# Patient Record
Sex: FEMALE | Race: WHITE | NOT HISPANIC OR LATINO | URBAN - METROPOLITAN AREA
[De-identification: names, ages, dates, MRNs, and addresses within clinical notes are randomized per-mention and may not be internally consistent; named-entity substitution may affect disease eponyms.]

---

## 2019-09-12 ENCOUNTER — APPOINTMENT (RX ONLY)
Dept: URBAN - METROPOLITAN AREA CLINIC 170 | Facility: CLINIC | Age: 44
Setting detail: DERMATOLOGY
End: 2019-09-12

## 2019-09-12 DIAGNOSIS — T78.40 ALLERGY, UNSPECIFIED: ICD-10-CM

## 2019-09-12 DIAGNOSIS — Z85.828 PERSONAL HISTORY OF OTHER MALIGNANT NEOPLASM OF SKIN: ICD-10-CM

## 2019-09-12 DIAGNOSIS — L82.1 OTHER SEBORRHEIC KERATOSIS: ICD-10-CM

## 2019-09-12 DIAGNOSIS — L70.0 ACNE VULGARIS: ICD-10-CM

## 2019-09-12 DIAGNOSIS — D18.0 HEMANGIOMA: ICD-10-CM

## 2019-09-12 DIAGNOSIS — D22 MELANOCYTIC NEVI: ICD-10-CM

## 2019-09-12 DIAGNOSIS — L57.0 ACTINIC KERATOSIS: ICD-10-CM

## 2019-09-12 DIAGNOSIS — L81.4 OTHER MELANIN HYPERPIGMENTATION: ICD-10-CM

## 2019-09-12 PROBLEM — T78.40XA ALLERGY, UNSPECIFIED, INITIAL ENCOUNTER: Status: ACTIVE | Noted: 2019-09-12

## 2019-09-12 PROBLEM — D18.01 HEMANGIOMA OF SKIN AND SUBCUTANEOUS TISSUE: Status: ACTIVE | Noted: 2019-09-12

## 2019-09-12 PROBLEM — D22.62 MELANOCYTIC NEVI OF LEFT UPPER LIMB, INCLUDING SHOULDER: Status: ACTIVE | Noted: 2019-09-12

## 2019-09-12 PROCEDURE — ? PHOTO-DOCUMENTATION

## 2019-09-12 PROCEDURE — ? COUNSELING

## 2019-09-12 PROCEDURE — ? PRESCRIPTION

## 2019-09-12 PROCEDURE — ? SUNSCREEN RECOMMENDATIONS

## 2019-09-12 PROCEDURE — 17000 DESTRUCT PREMALG LESION: CPT

## 2019-09-12 PROCEDURE — ? INVENTORY

## 2019-09-12 PROCEDURE — ? LIQUID NITROGEN

## 2019-09-12 PROCEDURE — 99213 OFFICE O/P EST LOW 20 MIN: CPT | Mod: 25

## 2019-09-12 RX ORDER — MUPIROCIN 20 MG/G
OINTMENT TOPICAL
Qty: 1 | Refills: 5 | Status: ERX | COMMUNITY
Start: 2019-09-12

## 2019-09-12 RX ORDER — FLUCONAZOLE 150 MG/1
TABLET ORAL
Qty: 1 | Refills: 5 | Status: ERX | COMMUNITY
Start: 2019-09-12

## 2019-09-12 RX ORDER — METRONIDAZOLE 7.5 MG/G
CREAM TOPICAL
Qty: 1 | Refills: 11 | Status: ERX | COMMUNITY
Start: 2019-09-12

## 2019-09-12 RX ORDER — DOXYCYCLINE HYCLATE 100 MG/1
CAPSULE, GELATIN COATED ORAL
Qty: 60 | Refills: 2 | Status: ERX | COMMUNITY
Start: 2019-09-12

## 2019-09-12 RX ADMIN — MUPIROCIN: 20 OINTMENT TOPICAL at 12:47

## 2019-09-12 RX ADMIN — DOXYCYCLINE HYCLATE: 100 CAPSULE, GELATIN COATED ORAL at 12:45

## 2019-09-12 RX ADMIN — FLUCONAZOLE: 150 TABLET ORAL at 12:50

## 2019-09-12 RX ADMIN — METRONIDAZOLE: 7.5 CREAM TOPICAL at 12:46

## 2019-09-12 ASSESSMENT — LOCATION DETAILED DESCRIPTION DERM
LOCATION DETAILED: RIGHT OCCIPITAL SCALP
LOCATION DETAILED: RIGHT LATERAL ABDOMEN
LOCATION DETAILED: LEFT DISTAL DORSAL FOREARM
LOCATION DETAILED: INFERIOR THORACIC SPINE
LOCATION DETAILED: RIGHT MID-UPPER BACK

## 2019-09-12 ASSESSMENT — LOCATION ZONE DERM
LOCATION ZONE: TRUNK
LOCATION ZONE: ARM
LOCATION ZONE: SCALP

## 2019-09-12 ASSESSMENT — LOCATION SIMPLE DESCRIPTION DERM
LOCATION SIMPLE: POSTERIOR SCALP
LOCATION SIMPLE: LEFT FOREARM
LOCATION SIMPLE: UPPER BACK
LOCATION SIMPLE: ABDOMEN
LOCATION SIMPLE: RIGHT UPPER BACK

## 2019-09-12 NOTE — PROCEDURE: LIQUID NITROGEN
Consent: The patient's consent was obtained including but not limited to risks of crusting, scabbing, blistering, scarring, darker or lighter pigmentary change, recurrence, incomplete removal and infection.
Detail Level: Detailed
Render Post-Care Instructions In Note?: yes
Number Of Freeze-Thaw Cycles: 1 freeze-thaw cycle
Post-Care Instructions: I reviewed with the patient in detail post-care instructions. Patient is to wear sunprotection, and avoid picking at any of the treated lesions. Pt may apply Vaseline to crusted or scabbing areas.
Duration Of Freeze Thaw-Cycle (Seconds): 5
Render Note In Bullet Format When Appropriate: No

## 2019-09-12 NOTE — HPI: EVALUATION OF SKIN LESION(S)
What Type Of Note Output Would You Prefer (Optional)?: Bullet Format
Hpi Title: Evaluation of Skin Lesions
Family Member: Dad
Additional History: \\nCheck right scalp, new growth, 1 month, no problem.\\nLeft lateral forearm, black growth, x years.

## 2019-09-12 NOTE — PROCEDURE: COUNSELING
Detail Level: Detailed
Detail Level: Generalized
Tazorac Pregnancy And Lactation Text: This medication is not safe during pregnancy. It is unknown if this medication is excreted in breast milk.
Benzoyl Peroxide Counseling: Patient counseled that medicine may cause skin irritation and bleach clothing.  In the event of skin irritation, the patient was advised to reduce the amount of the drug applied or use it less frequently.   The patient verbalized understanding of the proper use and possible adverse effects of benzoyl peroxide.  All of the patient's questions and concerns were addressed.
Topical Sulfur Applications Pregnancy And Lactation Text: This medication is Pregnancy Category C and has an unknown safety profile during pregnancy. It is unknown if this topical medication is excreted in breast milk.
Minocycline Pregnancy And Lactation Text: This medication is Pregnancy Category D and not consider safe during pregnancy. It is also excreted in breast milk.
Isotretinoin Counseling: Patient should get monthly blood tests, not donate blood, not drive at night if vision affected, not share medication, and not undergo elective surgery for 6 months after tx completed. Side effects reviewed, pt to contact office should one occur.
Dapsone Pregnancy And Lactation Text: This medication is Pregnancy Category C and is not considered safe during pregnancy or breast feeding.
Bactrim Counseling:  I discussed with the patient the risks of sulfa antibiotics including but not limited to GI upset, allergic reaction, drug rash, diarrhea, dizziness, photosensitivity, and yeast infections.  Rarely, more serious reactions can occur including but not limited to aplastic anemia, agranulocytosis, methemoglobinemia, blood dyscrasias, liver or kidney failure, lung infiltrates or desquamative/blistering drug rashes.
Tazorac Counseling:  Patient advised that medication is irritating and drying.  Patient may need to apply sparingly and wash off after an hour before eventually leaving it on overnight.  The patient verbalized understanding of the proper use and possible adverse effects of tazorac.  All of the patient's questions and concerns were addressed.
Minocycline Counseling: Patient advised regarding possible photosensitivity and discoloration of the teeth, skin, lips, tongue and gums.  Patient instructed to avoid sunlight, if possible.  When exposed to sunlight, patients should wear protective clothing, sunglasses, and sunscreen.  The patient was instructed to call the office immediately if the following severe adverse effects occur:  hearing changes, easy bruising/bleeding, severe headache, or vision changes.  The patient verbalized understanding of the proper use and possible adverse effects of minocycline.  All of the patient's questions and concerns were addressed.
Topical Sulfur Applications Counseling: Topical Sulfur Counseling: Patient counseled that this medication may cause skin irritation or allergic reactions.  In the event of skin irritation, the patient was advised to reduce the amount of the drug applied or use it less frequently.   The patient verbalized understanding of the proper use and possible adverse effects of topical sulfur application.  All of the patient's questions and concerns were addressed.
Erythromycin Pregnancy And Lactation Text: This medication is Pregnancy Category B and is considered safe during pregnancy. It is also excreted in breast milk.
Dapsone Counseling: I discussed with the patient the risks of dapsone including but not limited to hemolytic anemia, agranulocytosis, rashes, methemoglobinemia, kidney failure, peripheral neuropathy, headaches, GI upset, and liver toxicity.  Patients who start dapsone require monitoring including baseline LFTs and weekly CBCs for the first month, then every month thereafter.  The patient verbalized understanding of the proper use and possible adverse effects of dapsone.  All of the patient's questions and concerns were addressed.
Topical Retinoid Pregnancy And Lactation Text: This medication is Pregnancy Category C. It is unknown if this medication is excreted in breast milk.
Azithromycin Pregnancy And Lactation Text: This medication is considered safe during pregnancy and is also secreted in breast milk.
Tetracycline Counseling: Patient counseled regarding possible photosensitivity and increased risk for sunburn.  Patient instructed to avoid sunlight, if possible.  When exposed to sunlight, patients should wear protective clothing, sunglasses, and sunscreen.  The patient was instructed to call the office immediately if the following severe adverse effects occur:  hearing changes, easy bruising/bleeding, severe headache, or vision changes.  The patient verbalized understanding of the proper use and possible adverse effects of tetracycline.  All of the patient's questions and concerns were addressed. Patient understands to avoid pregnancy while on therapy due to potential birth defects.
High Dose Vitamin A Pregnancy And Lactation Text: High dose vitamin A therapy is contraindicated during pregnancy and breast feeding.
Topical Clindamycin Pregnancy And Lactation Text: This medication is Pregnancy Category B and is considered safe during pregnancy. It is unknown if it is excreted in breast milk.
Erythromycin Counseling:  I discussed with the patient the risks of erythromycin including but not limited to GI upset, allergic reaction, drug rash, diarrhea, increase in liver enzymes, and yeast infections.
Detail Level: Zone
Azithromycin Counseling:  I discussed with the patient the risks of azithromycin including but not limited to GI upset, allergic reaction, drug rash, diarrhea, and yeast infections.
Birth Control Pills Pregnancy And Lactation Text: This medication should be avoided if pregnant and for the first 30 days post-partum.
Topical Retinoid counseling:  Patient advised to apply a pea-sized amount only at bedtime and wait 30 minutes after washing their face before applying.  If too drying, patient may add a non-comedogenic moisturizer. The patient verbalized understanding of the proper use and possible adverse effects of retinoids.  All of the patient's questions and concerns were addressed.
Use Enhanced Medication Counseling?: No
Spironolactone Pregnancy And Lactation Text: This medication can cause feminization of the male fetus and should be avoided during pregnancy. The active metabolite is also found in breast milk.
High Dose Vitamin A Counseling: Side effects reviewed, pt to contact office should one occur.
Topical Clindamycin Counseling: Patient counseled that this medication may cause skin irritation or allergic reactions.  In the event of skin irritation, the patient was advised to reduce the amount of the drug applied or use it less frequently.   The patient verbalized understanding of the proper use and possible adverse effects of clindamycin.  All of the patient's questions and concerns were addressed.
Doxycycline Pregnancy And Lactation Text: This medication is Pregnancy Category D and not consider safe during pregnancy. It is also excreted in breast milk but is considered safe for shorter treatment courses.
Birth Control Pills Counseling: Birth Control Pill Counseling: I discussed with the patient the potential side effects of OCPs including but not limited to increased risk of stroke, heart attack, thrombophlebitis, deep venous thrombosis, hepatic adenomas, breast changes, GI upset, headaches, and depression.  The patient verbalized understanding of the proper use and possible adverse effects of OCPs. All of the patient's questions and concerns were addressed.
Benzoyl Peroxide Pregnancy And Lactation Text: This medication is Pregnancy Category C. It is unknown if benzoyl peroxide is excreted in breast milk.
Spironolactone Counseling: Patient advised regarding risks of diarrhea, abdominal pain, hyperkalemia, birth defects (for female patients), liver toxicity and renal toxicity. The patient may need blood work to monitor liver and kidney function and potassium levels while on therapy. The patient verbalized understanding of the proper use and possible adverse effects of spironolactone.  All of the patient's questions and concerns were addressed.
Isotretinoin Pregnancy And Lactation Text: This medication is Pregnancy Category X and is considered extremely dangerous during pregnancy. It is unknown if it is excreted in breast milk.
Bactrim Pregnancy And Lactation Text: This medication is Pregnancy Category D and is known to cause fetal risk.  It is also excreted in breast milk.
Doxycycline Counseling:  Patient counseled regarding possible photosensitivity and increased risk for sunburn.  Patient instructed to avoid sunlight, if possible.  When exposed to sunlight, patients should wear protective clothing, sunglasses, and sunscreen.  The patient was instructed to call the office immediately if the following severe adverse effects occur:  hearing changes, easy bruising/bleeding, severe headache, or vision changes.  The patient verbalized understanding of the proper use and possible adverse effects of doxycycline.  All of the patient's questions and concerns were addressed.

## 2019-09-12 NOTE — HPI: PIMPLES (ACNE)
What Type Of Note Output Would You Prefer (Optional)?: Bullet Format
How Severe Is Your Acne?: mild
Is This A New Presentation, Or A Follow-Up?: Follow Up Acne
Additional Comments (Use Complete Sentences): Need refills for metronidazole and doxycycline.

## 2019-09-12 NOTE — HPI: OTHER
Condition:: Allergic to neosporyn, need refills for muporcin.
Please Describe Your Condition:: Open sores

## 2019-09-12 NOTE — PROCEDURE: PHOTO-DOCUMENTATION
Photo Preface (Leave Blank If You Do Not Want): Photographs were obtained today
Detail Level: Detailed
Details (Free Text): Left lateral forearm

## 2021-05-06 ENCOUNTER — APPOINTMENT (RX ONLY)
Dept: URBAN - METROPOLITAN AREA CLINIC 170 | Facility: CLINIC | Age: 46
Setting detail: DERMATOLOGY
End: 2021-05-06

## 2021-05-06 DIAGNOSIS — L81.4 OTHER MELANIN HYPERPIGMENTATION: ICD-10-CM | Status: STABLE

## 2021-05-06 DIAGNOSIS — Z85.828 PERSONAL HISTORY OF OTHER MALIGNANT NEOPLASM OF SKIN: ICD-10-CM

## 2021-05-06 DIAGNOSIS — D485 NEOPLASM OF UNCERTAIN BEHAVIOR OF SKIN: ICD-10-CM

## 2021-05-06 DIAGNOSIS — D22 MELANOCYTIC NEVI: ICD-10-CM | Status: STABLE

## 2021-05-06 DIAGNOSIS — L82.1 OTHER SEBORRHEIC KERATOSIS: ICD-10-CM | Status: STABLE

## 2021-05-06 DIAGNOSIS — T78.40 ALLERGY, UNSPECIFIED: ICD-10-CM | Status: STABLE

## 2021-05-06 DIAGNOSIS — L70.0 ACNE VULGARIS: ICD-10-CM | Status: STABLE

## 2021-05-06 DIAGNOSIS — D18.0 HEMANGIOMA: ICD-10-CM | Status: STABLE

## 2021-05-06 PROBLEM — D48.5 NEOPLASM OF UNCERTAIN BEHAVIOR OF SKIN: Status: ACTIVE | Noted: 2021-05-06

## 2021-05-06 PROBLEM — D18.01 HEMANGIOMA OF SKIN AND SUBCUTANEOUS TISSUE: Status: ACTIVE | Noted: 2021-05-06

## 2021-05-06 PROBLEM — D22.5 MELANOCYTIC NEVI OF TRUNK: Status: ACTIVE | Noted: 2021-05-06

## 2021-05-06 PROBLEM — T78.40XA ALLERGY, UNSPECIFIED, INITIAL ENCOUNTER: Status: ACTIVE | Noted: 2021-05-06

## 2021-05-06 PROCEDURE — ? COUNSELING

## 2021-05-06 PROCEDURE — ? ADDITIONAL NOTES

## 2021-05-06 PROCEDURE — 11102 TANGNTL BX SKIN SINGLE LES: CPT

## 2021-05-06 PROCEDURE — ? FULL BODY SKIN EXAM

## 2021-05-06 PROCEDURE — ? PRESCRIPTION

## 2021-05-06 PROCEDURE — ? BIOPSY BY SHAVE METHOD

## 2021-05-06 PROCEDURE — 99214 OFFICE O/P EST MOD 30 MIN: CPT | Mod: 25

## 2021-05-06 PROCEDURE — ? TREATMENT REGIMEN

## 2021-05-06 RX ORDER — DOXYCYCLINE HYCLATE 100 MG/1
CAPSULE, GELATIN COATED ORAL
Qty: 60 | Refills: 2 | Status: ERX

## 2021-05-06 RX ORDER — MUPIROCIN 20 MG/G
OINTMENT TOPICAL
Qty: 1 | Refills: 2 | Status: ERX

## 2021-05-06 RX ORDER — FLUCONAZOLE 150 MG/1
TABLET ORAL
Qty: 1 | Refills: 5 | Status: ERX

## 2021-05-06 RX ORDER — METRONIDAZOLE 7.5 MG/G
CREAM TOPICAL
Qty: 1 | Refills: 11 | Status: ERX

## 2021-05-06 ASSESSMENT — LOCATION DETAILED DESCRIPTION DERM
LOCATION DETAILED: EPIGASTRIC SKIN
LOCATION DETAILED: INFERIOR THORACIC SPINE
LOCATION DETAILED: RIGHT MID-UPPER BACK
LOCATION DETAILED: RIGHT LATERAL ABDOMEN
LOCATION DETAILED: SUPERIOR THORACIC SPINE

## 2021-05-06 ASSESSMENT — LOCATION SIMPLE DESCRIPTION DERM
LOCATION SIMPLE: RIGHT UPPER BACK
LOCATION SIMPLE: UPPER BACK
LOCATION SIMPLE: ABDOMEN

## 2021-05-06 ASSESSMENT — LOCATION ZONE DERM: LOCATION ZONE: TRUNK

## 2021-05-06 NOTE — HPI: EVALUATION OF SKIN LESION(S)
What Type Of Note Output Would You Prefer (Optional)?: Bullet Format
Hpi Title: Evaluation of Skin Lesions
How Severe Are Your Spot(S)?: mild
Have Your Spot(S) Been Treated In The Past?: has not been treated
Additional History: Bio dad might have had skin cancer but not sure. Full body two spots on neck / upper back wants to be looked at .

## 2021-05-06 NOTE — PROCEDURE: COUNSELING
Tazorac Pregnancy And Lactation Text: This medication is not safe during pregnancy. It is unknown if this medication is excreted in breast milk.
Benzoyl Peroxide Counseling: Patient counseled that medicine may cause skin irritation and bleach clothing.  In the event of skin irritation, the patient was advised to reduce the amount of the drug applied or use it less frequently.   The patient verbalized understanding of the proper use and possible adverse effects of benzoyl peroxide.  All of the patient's questions and concerns were addressed.
Topical Sulfur Applications Pregnancy And Lactation Text: This medication is Pregnancy Category C and has an unknown safety profile during pregnancy. It is unknown if this topical medication is excreted in breast milk.
Minocycline Pregnancy And Lactation Text: This medication is Pregnancy Category D and not consider safe during pregnancy. It is also excreted in breast milk.
Isotretinoin Counseling: Patient should get monthly blood tests, not donate blood, not drive at night if vision affected, not share medication, and not undergo elective surgery for 6 months after tx completed. Side effects reviewed, pt to contact office should one occur.
Dapsone Pregnancy And Lactation Text: This medication is Pregnancy Category C and is not considered safe during pregnancy or breast feeding.
Bactrim Counseling:  I discussed with the patient the risks of sulfa antibiotics including but not limited to GI upset, allergic reaction, drug rash, diarrhea, dizziness, photosensitivity, and yeast infections.  Rarely, more serious reactions can occur including but not limited to aplastic anemia, agranulocytosis, methemoglobinemia, blood dyscrasias, liver or kidney failure, lung infiltrates or desquamative/blistering drug rashes.
Tazorac Counseling:  Patient advised that medication is irritating and drying.  Patient may need to apply sparingly and wash off after an hour before eventually leaving it on overnight.  The patient verbalized understanding of the proper use and possible adverse effects of tazorac.  All of the patient's questions and concerns were addressed.
Minocycline Counseling: Patient advised regarding possible photosensitivity and discoloration of the teeth, skin, lips, tongue and gums.  Patient instructed to avoid sunlight, if possible.  When exposed to sunlight, patients should wear protective clothing, sunglasses, and sunscreen.  The patient was instructed to call the office immediately if the following severe adverse effects occur:  hearing changes, easy bruising/bleeding, severe headache, or vision changes.  The patient verbalized understanding of the proper use and possible adverse effects of minocycline.  All of the patient's questions and concerns were addressed.
Topical Sulfur Applications Counseling: Topical Sulfur Counseling: Patient counseled that this medication may cause skin irritation or allergic reactions.  In the event of skin irritation, the patient was advised to reduce the amount of the drug applied or use it less frequently.   The patient verbalized understanding of the proper use and possible adverse effects of topical sulfur application.  All of the patient's questions and concerns were addressed.
Erythromycin Pregnancy And Lactation Text: This medication is Pregnancy Category B and is considered safe during pregnancy. It is also excreted in breast milk.
Dapsone Counseling: I discussed with the patient the risks of dapsone including but not limited to hemolytic anemia, agranulocytosis, rashes, methemoglobinemia, kidney failure, peripheral neuropathy, headaches, GI upset, and liver toxicity.  Patients who start dapsone require monitoring including baseline LFTs and weekly CBCs for the first month, then every month thereafter.  The patient verbalized understanding of the proper use and possible adverse effects of dapsone.  All of the patient's questions and concerns were addressed.
Topical Retinoid Pregnancy And Lactation Text: This medication is Pregnancy Category C. It is unknown if this medication is excreted in breast milk.
Azithromycin Pregnancy And Lactation Text: This medication is considered safe during pregnancy and is also secreted in breast milk.
Tetracycline Counseling: Patient counseled regarding possible photosensitivity and increased risk for sunburn.  Patient instructed to avoid sunlight, if possible.  When exposed to sunlight, patients should wear protective clothing, sunglasses, and sunscreen.  The patient was instructed to call the office immediately if the following severe adverse effects occur:  hearing changes, easy bruising/bleeding, severe headache, or vision changes.  The patient verbalized understanding of the proper use and possible adverse effects of tetracycline.  All of the patient's questions and concerns were addressed. Patient understands to avoid pregnancy while on therapy due to potential birth defects.
High Dose Vitamin A Pregnancy And Lactation Text: High dose vitamin A therapy is contraindicated during pregnancy and breast feeding.
Topical Clindamycin Pregnancy And Lactation Text: This medication is Pregnancy Category B and is considered safe during pregnancy. It is unknown if it is excreted in breast milk.
Erythromycin Counseling:  I discussed with the patient the risks of erythromycin including but not limited to GI upset, allergic reaction, drug rash, diarrhea, increase in liver enzymes, and yeast infections.
Detail Level: Zone
Azithromycin Counseling:  I discussed with the patient the risks of azithromycin including but not limited to GI upset, allergic reaction, drug rash, diarrhea, and yeast infections.
Birth Control Pills Pregnancy And Lactation Text: This medication should be avoided if pregnant and for the first 30 days post-partum.
Topical Retinoid counseling:  Patient advised to apply a pea-sized amount only at bedtime and wait 30 minutes after washing their face before applying.  If too drying, patient may add a non-comedogenic moisturizer. The patient verbalized understanding of the proper use and possible adverse effects of retinoids.  All of the patient's questions and concerns were addressed.
Use Enhanced Medication Counseling?: No
Spironolactone Pregnancy And Lactation Text: This medication can cause feminization of the male fetus and should be avoided during pregnancy. The active metabolite is also found in breast milk.
High Dose Vitamin A Counseling: Side effects reviewed, pt to contact office should one occur.
Topical Clindamycin Counseling: Patient counseled that this medication may cause skin irritation or allergic reactions.  In the event of skin irritation, the patient was advised to reduce the amount of the drug applied or use it less frequently.   The patient verbalized understanding of the proper use and possible adverse effects of clindamycin.  All of the patient's questions and concerns were addressed.
Doxycycline Pregnancy And Lactation Text: This medication is Pregnancy Category D and not consider safe during pregnancy. It is also excreted in breast milk but is considered safe for shorter treatment courses.
Birth Control Pills Counseling: Birth Control Pill Counseling: I discussed with the patient the potential side effects of OCPs including but not limited to increased risk of stroke, heart attack, thrombophlebitis, deep venous thrombosis, hepatic adenomas, breast changes, GI upset, headaches, and depression.  The patient verbalized understanding of the proper use and possible adverse effects of OCPs. All of the patient's questions and concerns were addressed.
Benzoyl Peroxide Pregnancy And Lactation Text: This medication is Pregnancy Category C. It is unknown if benzoyl peroxide is excreted in breast milk.
Spironolactone Counseling: Patient advised regarding risks of diarrhea, abdominal pain, hyperkalemia, birth defects (for female patients), liver toxicity and renal toxicity. The patient may need blood work to monitor liver and kidney function and potassium levels while on therapy. The patient verbalized understanding of the proper use and possible adverse effects of spironolactone.  All of the patient's questions and concerns were addressed.
Isotretinoin Pregnancy And Lactation Text: This medication is Pregnancy Category X and is considered extremely dangerous during pregnancy. It is unknown if it is excreted in breast milk.
Bactrim Pregnancy And Lactation Text: This medication is Pregnancy Category D and is known to cause fetal risk.  It is also excreted in breast milk.
Doxycycline Counseling:  Patient counseled regarding possible photosensitivity and increased risk for sunburn.  Patient instructed to avoid sunlight, if possible.  When exposed to sunlight, patients should wear protective clothing, sunglasses, and sunscreen.  The patient was instructed to call the office immediately if the following severe adverse effects occur:  hearing changes, easy bruising/bleeding, severe headache, or vision changes.  The patient verbalized understanding of the proper use and possible adverse effects of doxycycline.  All of the patient's questions and concerns were addressed.
Detail Level: Detailed
Detail Level: Generalized

## 2021-05-06 NOTE — PROCEDURE: BIOPSY BY SHAVE METHOD

## 2021-05-23 PROCEDURE — 87086 URINE CULTURE/COLONY COUNT: CPT

## 2021-05-23 PROCEDURE — 81001 URINALYSIS AUTO W/SCOPE: CPT

## 2021-05-23 PROCEDURE — 96372 THER/PROPH/DIAG INJ SC/IM: CPT

## 2021-05-23 PROCEDURE — 96361 HYDRATE IV INFUSION ADD-ON: CPT

## 2021-05-23 PROCEDURE — 96374 THER/PROPH/DIAG INJ IV PUSH: CPT

## 2021-05-23 PROCEDURE — 96375 TX/PRO/DX INJ NEW DRUG ADDON: CPT

## 2021-05-23 PROCEDURE — 99283 EMERGENCY DEPT VISIT LOW MDM: CPT

## 2021-05-23 ASSESSMENT — PAIN SCALES - GENERAL: PAINLEVEL_OUTOF10: 9

## 2021-05-24 ENCOUNTER — APPOINTMENT (OUTPATIENT)
Dept: GENERAL RADIOLOGY | Age: 46
DRG: 871 | End: 2021-05-24
Payer: COMMERCIAL

## 2021-05-24 ENCOUNTER — HOSPITAL ENCOUNTER (INPATIENT)
Age: 46
LOS: 5 days | Discharge: HOME HEALTH | DRG: 871 | End: 2021-05-29
Attending: EMERGENCY MEDICINE | Admitting: INTERNAL MEDICINE
Payer: COMMERCIAL

## 2021-05-24 ENCOUNTER — APPOINTMENT (OUTPATIENT)
Dept: CT IMAGING | Age: 46
DRG: 871 | End: 2021-05-24
Payer: COMMERCIAL

## 2021-05-24 DIAGNOSIS — A41.9 SEPTICEMIA (HCC): Primary | ICD-10-CM

## 2021-05-24 DIAGNOSIS — N30.00 ACUTE CYSTITIS WITHOUT HEMATURIA: ICD-10-CM

## 2021-05-24 DIAGNOSIS — R10.11 ABDOMINAL PAIN, RIGHT UPPER QUADRANT: ICD-10-CM

## 2021-05-24 DIAGNOSIS — K62.89 PROCTITIS: ICD-10-CM

## 2021-05-24 PROBLEM — R50.9 ACUTE FEBRILE ILLNESS: Status: ACTIVE | Noted: 2021-05-24

## 2021-05-24 PROBLEM — R10.9 ABDOMINAL PAIN: Status: ACTIVE | Noted: 2021-05-24

## 2021-05-24 LAB
A/G RATIO: 1.3 (ref 1.1–2.2)
ALBUMIN SERPL-MCNC: 4.2 G/DL (ref 3.4–5)
ALP BLD-CCNC: 72 U/L (ref 40–129)
ALT SERPL-CCNC: 56 U/L (ref 10–40)
AMPHETAMINE SCREEN, URINE: ABNORMAL
ANION GAP SERPL CALCULATED.3IONS-SCNC: 14 MMOL/L (ref 3–16)
AST SERPL-CCNC: 58 U/L (ref 15–37)
BACTERIA: ABNORMAL /HPF
BARBITURATE SCREEN URINE: ABNORMAL
BASOPHILS ABSOLUTE: 0 K/UL (ref 0–0.2)
BASOPHILS RELATIVE PERCENT: 0.2 %
BENZODIAZEPINE SCREEN, URINE: ABNORMAL
BILIRUB SERPL-MCNC: 0.8 MG/DL (ref 0–1)
BILIRUBIN URINE: NEGATIVE
BLOOD, URINE: NEGATIVE
BUN BLDV-MCNC: 11 MG/DL (ref 7–20)
CALCIUM SERPL-MCNC: 9.4 MG/DL (ref 8.3–10.6)
CANNABINOID SCREEN URINE: ABNORMAL
CHLORIDE BLD-SCNC: 102 MMOL/L (ref 99–110)
CLARITY: ABNORMAL
CO2: 20 MMOL/L (ref 21–32)
COCAINE METABOLITE SCREEN URINE: ABNORMAL
COLOR: YELLOW
CREAT SERPL-MCNC: 0.7 MG/DL (ref 0.6–1.1)
EOSINOPHILS ABSOLUTE: 0 K/UL (ref 0–0.6)
EOSINOPHILS RELATIVE PERCENT: 0 %
EPITHELIAL CELLS, UA: 6 /HPF (ref 0–5)
GFR AFRICAN AMERICAN: >60
GFR NON-AFRICAN AMERICAN: >60
GLOBULIN: 3.3 G/DL
GLUCOSE BLD-MCNC: 142 MG/DL (ref 70–99)
GLUCOSE URINE: NEGATIVE MG/DL
HAV IGM SER IA-ACNC: NORMAL
HCG QUALITATIVE: NEGATIVE
HCT VFR BLD CALC: 39.2 % (ref 36–48)
HEMOGLOBIN: 13 G/DL (ref 12–16)
HEPATITIS B CORE IGM ANTIBODY: NORMAL
HEPATITIS B SURFACE ANTIGEN INTERPRETATION: NORMAL
HEPATITIS C ANTIBODY INTERPRETATION: NORMAL
HYALINE CASTS: 1 /LPF (ref 0–8)
KETONES, URINE: 15 MG/DL
LACTIC ACID: 1.7 MMOL/L (ref 0.4–2)
LEUKOCYTE ESTERASE, URINE: ABNORMAL
LIPASE: 37 U/L (ref 13–60)
LYMPHOCYTES ABSOLUTE: 0.4 K/UL (ref 1–5.1)
LYMPHOCYTES RELATIVE PERCENT: 3.3 %
Lab: ABNORMAL
MCH RBC QN AUTO: 28.3 PG (ref 26–34)
MCHC RBC AUTO-ENTMCNC: 33.2 G/DL (ref 31–36)
MCV RBC AUTO: 85.1 FL (ref 80–100)
METHADONE SCREEN, URINE: ABNORMAL
MICROSCOPIC EXAMINATION: YES
MONOCYTES ABSOLUTE: 0.6 K/UL (ref 0–1.3)
MONOCYTES RELATIVE PERCENT: 4.7 %
NEUTROPHILS ABSOLUTE: 11.2 K/UL (ref 1.7–7.7)
NEUTROPHILS RELATIVE PERCENT: 91.8 %
NITRITE, URINE: NEGATIVE
OPIATE SCREEN URINE: POSITIVE
OXYCODONE URINE: ABNORMAL
PDW BLD-RTO: 13.7 % (ref 12.4–15.4)
PH UA: 6.5
PH UA: 8.5 (ref 5–8)
PHENCYCLIDINE SCREEN URINE: ABNORMAL
PLATELET # BLD: 162 K/UL (ref 135–450)
PMV BLD AUTO: 7.8 FL (ref 5–10.5)
POTASSIUM SERPL-SCNC: 4 MMOL/L (ref 3.5–5.1)
PROCALCITONIN: 0.15 NG/ML (ref 0–0.15)
PROPOXYPHENE SCREEN: ABNORMAL
PROTEIN UA: 30 MG/DL
RAPID INFLUENZA  B AGN: NEGATIVE
RAPID INFLUENZA A AGN: NEGATIVE
RBC # BLD: 4.6 M/UL (ref 4–5.2)
RBC UA: ABNORMAL /HPF (ref 0–4)
SARS-COV-2: NOT DETECTED
SODIUM BLD-SCNC: 136 MMOL/L (ref 136–145)
SPECIFIC GRAVITY UA: 1.02 (ref 1–1.03)
TOTAL PROTEIN: 7.5 G/DL (ref 6.4–8.2)
URINE REFLEX TO CULTURE: YES
URINE TYPE: ABNORMAL
UROBILINOGEN, URINE: 0.2 E.U./DL
WBC # BLD: 12.2 K/UL (ref 4–11)
WBC UA: 11 /HPF (ref 0–5)

## 2021-05-24 PROCEDURE — 71045 X-RAY EXAM CHEST 1 VIEW: CPT

## 2021-05-24 PROCEDURE — 87804 INFLUENZA ASSAY W/OPTIC: CPT

## 2021-05-24 PROCEDURE — 2500000003 HC RX 250 WO HCPCS: Performed by: INTERNAL MEDICINE

## 2021-05-24 PROCEDURE — 6360000002 HC RX W HCPCS: Performed by: INTERNAL MEDICINE

## 2021-05-24 PROCEDURE — 2580000003 HC RX 258: Performed by: EMERGENCY MEDICINE

## 2021-05-24 PROCEDURE — 36415 COLL VENOUS BLD VENIPUNCTURE: CPT

## 2021-05-24 PROCEDURE — 84145 PROCALCITONIN (PCT): CPT

## 2021-05-24 PROCEDURE — 6370000000 HC RX 637 (ALT 250 FOR IP): Performed by: INTERNAL MEDICINE

## 2021-05-24 PROCEDURE — 2580000003 HC RX 258: Performed by: INTERNAL MEDICINE

## 2021-05-24 PROCEDURE — U0003 INFECTIOUS AGENT DETECTION BY NUCLEIC ACID (DNA OR RNA); SEVERE ACUTE RESPIRATORY SYNDROME CORONAVIRUS 2 (SARS-COV-2) (CORONAVIRUS DISEASE [COVID-19]), AMPLIFIED PROBE TECHNIQUE, MAKING USE OF HIGH THROUGHPUT TECHNOLOGIES AS DESCRIBED BY CMS-2020-01-R: HCPCS

## 2021-05-24 PROCEDURE — 80074 ACUTE HEPATITIS PANEL: CPT

## 2021-05-24 PROCEDURE — 99255 IP/OBS CONSLTJ NEW/EST HI 80: CPT | Performed by: INTERNAL MEDICINE

## 2021-05-24 PROCEDURE — 85025 COMPLETE CBC W/AUTO DIFF WBC: CPT

## 2021-05-24 PROCEDURE — 83690 ASSAY OF LIPASE: CPT

## 2021-05-24 PROCEDURE — 80053 COMPREHEN METABOLIC PANEL: CPT

## 2021-05-24 PROCEDURE — 80307 DRUG TEST PRSMV CHEM ANLYZR: CPT

## 2021-05-24 PROCEDURE — 87040 BLOOD CULTURE FOR BACTERIA: CPT

## 2021-05-24 PROCEDURE — 84703 CHORIONIC GONADOTROPIN ASSAY: CPT

## 2021-05-24 PROCEDURE — 6360000002 HC RX W HCPCS: Performed by: EMERGENCY MEDICINE

## 2021-05-24 PROCEDURE — 74177 CT ABD & PELVIS W/CONTRAST: CPT

## 2021-05-24 PROCEDURE — 6360000004 HC RX CONTRAST MEDICATION: Performed by: EMERGENCY MEDICINE

## 2021-05-24 PROCEDURE — 6360000002 HC RX W HCPCS: Performed by: NURSE PRACTITIONER

## 2021-05-24 PROCEDURE — 1200000000 HC SEMI PRIVATE

## 2021-05-24 PROCEDURE — U0005 INFEC AGEN DETEC AMPLI PROBE: HCPCS

## 2021-05-24 PROCEDURE — 2580000003 HC RX 258: Performed by: NURSE PRACTITIONER

## 2021-05-24 PROCEDURE — 74018 RADEX ABDOMEN 1 VIEW: CPT

## 2021-05-24 PROCEDURE — 83605 ASSAY OF LACTIC ACID: CPT

## 2021-05-24 RX ORDER — PROMETHAZINE HYDROCHLORIDE 25 MG/ML
12.5 INJECTION, SOLUTION INTRAMUSCULAR; INTRAVENOUS EVERY 4 HOURS PRN
Status: DISCONTINUED | OUTPATIENT
Start: 2021-05-24 | End: 2021-05-29 | Stop reason: HOSPADM

## 2021-05-24 RX ORDER — SODIUM CHLORIDE, SODIUM LACTATE, POTASSIUM CHLORIDE, CALCIUM CHLORIDE 600; 310; 30; 20 MG/100ML; MG/100ML; MG/100ML; MG/100ML
INJECTION, SOLUTION INTRAVENOUS CONTINUOUS
Status: ACTIVE | OUTPATIENT
Start: 2021-05-24 | End: 2021-05-26

## 2021-05-24 RX ORDER — POLYETHYLENE GLYCOL 3350 17 G/17G
17 POWDER, FOR SOLUTION ORAL DAILY PRN
Status: DISCONTINUED | OUTPATIENT
Start: 2021-05-24 | End: 2021-05-29 | Stop reason: HOSPADM

## 2021-05-24 RX ORDER — MORPHINE SULFATE 4 MG/ML
4 INJECTION, SOLUTION INTRAMUSCULAR; INTRAVENOUS ONCE
Status: COMPLETED | OUTPATIENT
Start: 2021-05-24 | End: 2021-05-24

## 2021-05-24 RX ORDER — CIPROFLOXACIN 2 MG/ML
400 INJECTION, SOLUTION INTRAVENOUS EVERY 12 HOURS
Status: DISCONTINUED | OUTPATIENT
Start: 2021-05-24 | End: 2021-05-25

## 2021-05-24 RX ORDER — SODIUM CHLORIDE 0.9 % (FLUSH) 0.9 %
5-40 SYRINGE (ML) INJECTION EVERY 12 HOURS SCHEDULED
Status: DISCONTINUED | OUTPATIENT
Start: 2021-05-24 | End: 2021-05-26 | Stop reason: SDUPTHER

## 2021-05-24 RX ORDER — ONDANSETRON 2 MG/ML
4 INJECTION INTRAMUSCULAR; INTRAVENOUS
Status: DISCONTINUED | OUTPATIENT
Start: 2021-05-24 | End: 2021-05-24 | Stop reason: HOSPADM

## 2021-05-24 RX ORDER — MORPHINE SULFATE 4 MG/ML
4 INJECTION, SOLUTION INTRAMUSCULAR; INTRAVENOUS
Status: DISCONTINUED | OUTPATIENT
Start: 2021-05-24 | End: 2021-05-26

## 2021-05-24 RX ORDER — PANTOPRAZOLE SODIUM 40 MG/1
40 TABLET, DELAYED RELEASE ORAL
Status: DISCONTINUED | OUTPATIENT
Start: 2021-05-24 | End: 2021-05-29 | Stop reason: HOSPADM

## 2021-05-24 RX ORDER — PROMETHAZINE HYDROCHLORIDE 25 MG/1
12.5 TABLET ORAL EVERY 6 HOURS PRN
Status: DISCONTINUED | OUTPATIENT
Start: 2021-05-24 | End: 2021-05-29 | Stop reason: HOSPADM

## 2021-05-24 RX ORDER — SODIUM CHLORIDE 9 MG/ML
25 INJECTION, SOLUTION INTRAVENOUS PRN
Status: DISCONTINUED | OUTPATIENT
Start: 2021-05-24 | End: 2021-05-26 | Stop reason: SDUPTHER

## 2021-05-24 RX ORDER — CLONAZEPAM 0.12 MG/1
0.12 TABLET, ORALLY DISINTEGRATING ORAL NIGHTLY PRN
COMMUNITY

## 2021-05-24 RX ORDER — ACETAMINOPHEN 650 MG/1
650 SUPPOSITORY RECTAL EVERY 6 HOURS PRN
Status: DISCONTINUED | OUTPATIENT
Start: 2021-05-24 | End: 2021-05-29 | Stop reason: HOSPADM

## 2021-05-24 RX ORDER — ACETAMINOPHEN 325 MG/1
650 TABLET ORAL EVERY 6 HOURS PRN
Status: DISCONTINUED | OUTPATIENT
Start: 2021-05-24 | End: 2021-05-29 | Stop reason: HOSPADM

## 2021-05-24 RX ORDER — SODIUM CHLORIDE 0.9 % (FLUSH) 0.9 %
10 SYRINGE (ML) INJECTION PRN
Status: DISCONTINUED | OUTPATIENT
Start: 2021-05-24 | End: 2021-05-26 | Stop reason: SDUPTHER

## 2021-05-24 RX ORDER — ONDANSETRON 2 MG/ML
4 INJECTION INTRAMUSCULAR; INTRAVENOUS EVERY 6 HOURS PRN
Status: DISCONTINUED | OUTPATIENT
Start: 2021-05-24 | End: 2021-05-29 | Stop reason: HOSPADM

## 2021-05-24 RX ORDER — OXYCODONE HYDROCHLORIDE 5 MG/1
5 TABLET ORAL EVERY 6 HOURS PRN
Status: ACTIVE | OUTPATIENT
Start: 2021-05-24 | End: 2021-05-26

## 2021-05-24 RX ORDER — 0.9 % SODIUM CHLORIDE 0.9 %
1000 INTRAVENOUS SOLUTION INTRAVENOUS ONCE
Status: COMPLETED | OUTPATIENT
Start: 2021-05-24 | End: 2021-05-24

## 2021-05-24 RX ORDER — PROMETHAZINE HYDROCHLORIDE 25 MG/ML
25 INJECTION, SOLUTION INTRAMUSCULAR; INTRAVENOUS ONCE
Status: COMPLETED | OUTPATIENT
Start: 2021-05-24 | End: 2021-05-24

## 2021-05-24 RX ORDER — KETOROLAC TROMETHAMINE 30 MG/ML
15 INJECTION, SOLUTION INTRAMUSCULAR; INTRAVENOUS ONCE
Status: COMPLETED | OUTPATIENT
Start: 2021-05-24 | End: 2021-05-24

## 2021-05-24 RX ORDER — MORPHINE SULFATE 2 MG/ML
2 INJECTION, SOLUTION INTRAMUSCULAR; INTRAVENOUS EVERY 4 HOURS PRN
Status: DISCONTINUED | OUTPATIENT
Start: 2021-05-24 | End: 2021-05-24

## 2021-05-24 RX ORDER — HYDROMORPHONE HYDROCHLORIDE 1 MG/ML
1 INJECTION, SOLUTION INTRAMUSCULAR; INTRAVENOUS; SUBCUTANEOUS
Status: DISCONTINUED | OUTPATIENT
Start: 2021-05-24 | End: 2021-05-24 | Stop reason: HOSPADM

## 2021-05-24 RX ADMIN — METRONIDAZOLE 500 MG: 500 INJECTION, SOLUTION INTRAVENOUS at 12:54

## 2021-05-24 RX ADMIN — SODIUM CHLORIDE, POTASSIUM CHLORIDE, SODIUM LACTATE AND CALCIUM CHLORIDE: 600; 310; 30; 20 INJECTION, SOLUTION INTRAVENOUS at 14:33

## 2021-05-24 RX ADMIN — SODIUM CHLORIDE, POTASSIUM CHLORIDE, SODIUM LACTATE AND CALCIUM CHLORIDE: 600; 310; 30; 20 INJECTION, SOLUTION INTRAVENOUS at 06:55

## 2021-05-24 RX ADMIN — MORPHINE SULFATE 4 MG: 4 INJECTION, SOLUTION INTRAMUSCULAR; INTRAVENOUS at 00:52

## 2021-05-24 RX ADMIN — PANTOPRAZOLE SODIUM 40 MG: 40 TABLET, DELAYED RELEASE ORAL at 06:40

## 2021-05-24 RX ADMIN — Medication 10 ML: at 21:40

## 2021-05-24 RX ADMIN — ONDANSETRON 4 MG: 2 INJECTION INTRAMUSCULAR; INTRAVENOUS at 02:41

## 2021-05-24 RX ADMIN — SODIUM CHLORIDE 1000 ML: 9 INJECTION, SOLUTION INTRAVENOUS at 02:39

## 2021-05-24 RX ADMIN — MORPHINE SULFATE 4 MG: 4 INJECTION, SOLUTION INTRAMUSCULAR; INTRAVENOUS at 20:21

## 2021-05-24 RX ADMIN — PROMETHAZINE HYDROCHLORIDE 12.5 MG: 25 INJECTION INTRAMUSCULAR; INTRAVENOUS at 20:21

## 2021-05-24 RX ADMIN — METRONIDAZOLE 500 MG: 500 INJECTION, SOLUTION INTRAVENOUS at 20:31

## 2021-05-24 RX ADMIN — PROMETHAZINE HYDROCHLORIDE 12.5 MG: 25 TABLET ORAL at 06:44

## 2021-05-24 RX ADMIN — CIPROFLOXACIN 400 MG: 2 INJECTION, SOLUTION INTRAVENOUS at 23:40

## 2021-05-24 RX ADMIN — KETOROLAC TROMETHAMINE 15 MG: 30 INJECTION, SOLUTION INTRAMUSCULAR at 00:52

## 2021-05-24 RX ADMIN — PROMETHAZINE HYDROCHLORIDE 25 MG: 25 INJECTION, SOLUTION INTRAMUSCULAR; INTRAVENOUS at 00:52

## 2021-05-24 RX ADMIN — SODIUM CHLORIDE 25 ML: 9 INJECTION, SOLUTION INTRAVENOUS at 20:26

## 2021-05-24 RX ADMIN — MORPHINE SULFATE 4 MG: 4 INJECTION, SOLUTION INTRAMUSCULAR; INTRAVENOUS at 13:32

## 2021-05-24 RX ADMIN — SODIUM CHLORIDE, POTASSIUM CHLORIDE, SODIUM LACTATE AND CALCIUM CHLORIDE: 600; 310; 30; 20 INJECTION, SOLUTION INTRAVENOUS at 18:43

## 2021-05-24 RX ADMIN — Medication 1000 MG: at 00:51

## 2021-05-24 RX ADMIN — MORPHINE SULFATE 2 MG: 2 INJECTION, SOLUTION INTRAMUSCULAR; INTRAVENOUS at 07:32

## 2021-05-24 RX ADMIN — ENOXAPARIN SODIUM 40 MG: 40 INJECTION SUBCUTANEOUS at 10:24

## 2021-05-24 RX ADMIN — ONDANSETRON 4 MG: 2 INJECTION INTRAMUSCULAR; INTRAVENOUS at 11:42

## 2021-05-24 RX ADMIN — CIPROFLOXACIN 400 MG: 2 INJECTION, SOLUTION INTRAVENOUS at 11:39

## 2021-05-24 RX ADMIN — SODIUM CHLORIDE 1000 ML: 9 INJECTION, SOLUTION INTRAVENOUS at 00:51

## 2021-05-24 RX ADMIN — HYDROMORPHONE HYDROCHLORIDE 1 MG: 1 INJECTION, SOLUTION INTRAMUSCULAR; INTRAVENOUS; SUBCUTANEOUS at 02:41

## 2021-05-24 RX ADMIN — IOPAMIDOL 75 ML: 755 INJECTION, SOLUTION INTRAVENOUS at 01:12

## 2021-05-24 RX ADMIN — ACETAMINOPHEN 650 MG: 325 TABLET ORAL at 16:09

## 2021-05-24 RX ADMIN — PROMETHAZINE HYDROCHLORIDE 12.5 MG: 25 INJECTION INTRAMUSCULAR; INTRAVENOUS at 13:33

## 2021-05-24 RX ADMIN — SODIUM CHLORIDE 25 ML: 9 INJECTION, SOLUTION INTRAVENOUS at 11:35

## 2021-05-24 RX ADMIN — SODIUM CHLORIDE 25 ML: 9 INJECTION, SOLUTION INTRAVENOUS at 23:38

## 2021-05-24 ASSESSMENT — PAIN SCALES - GENERAL
PAINLEVEL_OUTOF10: 0
PAINLEVEL_OUTOF10: 3
PAINLEVEL_OUTOF10: 0
PAINLEVEL_OUTOF10: 10
PAINLEVEL_OUTOF10: 4
PAINLEVEL_OUTOF10: 7
PAINLEVEL_OUTOF10: 2
PAINLEVEL_OUTOF10: 0
PAINLEVEL_OUTOF10: 7
PAINLEVEL_OUTOF10: 4
PAINLEVEL_OUTOF10: 7
PAINLEVEL_OUTOF10: 5

## 2021-05-24 ASSESSMENT — ENCOUNTER SYMPTOMS
EYE REDNESS: 0
ANAL BLEEDING: 1
COUGH: 0
CHOKING: 0
DIARRHEA: 0
STRIDOR: 0
BLOOD IN STOOL: 1
FACIAL SWELLING: 0
RHINORRHEA: 0
NAUSEA: 1
APNEA: 0
SHORTNESS OF BREATH: 0
EYE DISCHARGE: 0
CHEST TIGHTNESS: 0
ABDOMINAL PAIN: 0
TROUBLE SWALLOWING: 0
PHOTOPHOBIA: 0
COLOR CHANGE: 0
RECTAL PAIN: 1

## 2021-05-24 ASSESSMENT — PAIN DESCRIPTION - PAIN TYPE
TYPE: ACUTE PAIN

## 2021-05-24 ASSESSMENT — PAIN DESCRIPTION - LOCATION
LOCATION: ABDOMEN;HEAD
LOCATION: ABDOMEN;HEAD
LOCATION: ABDOMEN
LOCATION: ABDOMEN;HEAD

## 2021-05-24 ASSESSMENT — PAIN - FUNCTIONAL ASSESSMENT: PAIN_FUNCTIONAL_ASSESSMENT: PREVENTS OR INTERFERES SOME ACTIVE ACTIVITIES AND ADLS

## 2021-05-24 NOTE — ED PROVIDER NOTES
presentation. Lactate is normal though and no end organ dysfunction to suggest severe sepsis or shock. Equivocal UTI on UA, pan-cultured. Given Rocephin here and admitted for IVF and sx control, serial abd exams, and may need further workup depending on her clinical course. SEP-1 CORE MEASURE DATA    Classification: exclude from core measure    Exclusion criteria: the patient is NOT to be included for sepsis due to:  Patient has sepsis and no end-organ dysfunction is identified and so is not to be included    During the patient's ED course, the patient was given:  Medications   HYDROmorphone HCl PF (DILAUDID) injection 1 mg (has no administration in time range)   ondansetron (ZOFRAN) injection 4 mg (has no administration in time range)   0.9 % sodium chloride bolus (has no administration in time range)   0.9 % sodium chloride bolus (1,000 mLs Intravenous New Bag 5/24/21 0051)   morphine injection 4 mg (4 mg Intravenous Given 5/24/21 0052)   promethazine (PHENERGAN) injection 25 mg (25 mg Intramuscular Given 5/24/21 0052)   ketorolac (TORADOL) injection 15 mg (15 mg Intravenous Given 5/24/21 0052)   cefTRIAXone (ROCEPHIN) 1000 mg in sterile water 10 mL IV syringe (1,000 mg Intravenous Given 5/24/21 0051)   iopamidol (ISOVUE-370) 76 % injection 75 mL (75 mLs Intravenous Given 5/24/21 0112)        CLINICAL IMPRESSION  1. Septicemia ([address]    2. Proctitis    3. Acute cystitis without hematuria    4. Abdominal pain, right upper quadrant        DISPOSITION  Kim Burton was admitted in fair condition. The plan is to admit to the hospital at this time under the hospitalist service. Dr. Russ Grimm accepted the patient and will take over the patient's care. The total critical care time spent while evaluating and treating this patient was 35 minutes. This excludes time spent doing separately billable procedures.   This includes time at the bedside, data interpretation, medication management, obtaining critical history from collateral sources if the patient is unable to provide it directly, and physician consultation. Specifics of interventions taken and potentially life-threatening diagnostic considerations are listed above in the medical decision making. This chart was created using Dragon dictation software. Efforts were made by me to ensure accuracy, however some errors may be present due to limitations of this technology.             Tonja Salazar MD  05/24/21 1930

## 2021-05-24 NOTE — PROGRESS NOTES
Hospitalist Progress Note      PCP: No primary care provider on file. Date of Admission: 5/24/2021    Chief Complaint: Fever    Hospital Course: 53 yo F with history of recent hemorrhoid banding who came to ER with fevers. Had gone to [facility] ED and sent home. Admitted as inpatient for fever of unclear origin with sepsis. Started on IV Cipro and Flagyl for proctitis. GI and ID consulted. Echo ordered. Subjective: Patient denies CP, SOB, HA or abdominal pain. Still has fevers. No cough. Medications:  Reviewed    Infusion Medications    sodium chloride 25 mL (05/24/21 1135)    lactated ringers 125 mL/hr at 05/24/21 0655     Scheduled Medications    sodium chloride flush  5-40 mL Intravenous 2 times per day    enoxaparin  40 mg Subcutaneous Daily    pantoprazole  40 mg Oral QAM AC    GI cocktail   Oral Once    metroNIDAZOLE  500 mg Intravenous Q8H    ciprofloxacin  400 mg Intravenous Q12H     PRN Meds: sodium chloride flush, sodium chloride, promethazine **OR** ondansetron, polyethylene glycol, acetaminophen **OR** acetaminophen, oxyCODONE, perflutren lipid microspheres, morphine      Intake/Output Summary (Last 24 hours) at 5/24/2021 1226  Last data filed at 5/24/2021 0900  Gross per 24 hour   Intake 1479 ml   Output --   Net 1479 ml       Physical Exam Performed:    /67   Pulse 120   Temp 100.4 °F (38 °C) (Oral)   Resp 18   Ht 5' 5\" (1.651 m)   Wt 155 lb (70.3 kg)   SpO2 100%   BMI 25.79 kg/m²     General appearance: No apparent distress, appears stated age and cooperative. HEENT: Pupils equal, round, and reactive to light. Conjunctivae/corneas clear. Neck: Supple, with full range of motion. No jugular venous distention. Trachea midline. Respiratory:  Normal respiratory effort. Clear to auscultation, bilaterally without Rales/Wheezes/Rhonchi. Cardiovascular: Regular rate and rhythm with normal S1/S2 without murmurs, rubs or gallops.   Abdomen: Soft, non-tender, non-distended with normal bowel sounds. Musculoskeletal: No clubbing, cyanosis or edema bilaterally. Full range of motion without deformity. Skin: Skin color, texture, turgor normal.  No rashes or lesions. Neurologic:  Neurovascularly intact without any focal sensory/motor deficits. Cranial nerves: II-XII intact, grossly non-focal.  Psychiatric: Alert and oriented, thought content appropriate, normal insight  Capillary Refill: Brisk,3 seconds, normal   Peripheral Pulses: +2 palpable, equal bilaterally       Labs:   Recent Labs     05/24/21 0030   WBC 12.2*   HGB 13.0   HCT 39.2        Recent Labs     05/24/21 0030      K 4.0      CO2 20*   BUN 11   CREATININE 0.7   CALCIUM 9.4     Recent Labs     05/24/21 0030   AST 58*   ALT 56*   BILITOT 0.8   ALKPHOS 72     No results for input(s): INR in the last 72 hours. No results for input(s): Rejeana Kemps in the last 72 hours. Urinalysis:      Lab Results   Component Value Date    NITRU Negative 05/23/2021    WBCUA 11 05/23/2021    BACTERIA 2+ 05/23/2021    RBCUA 0-2 05/23/2021    BLOODU Negative 05/23/2021    SPECGRAV 1.016 05/23/2021    GLUCOSEU Negative 05/23/2021       Radiology:  XR ABDOMEN (KUB) (SINGLE AP VIEW)   Final Result   Unremarkable abdomen. CT ABDOMEN PELVIS W IV CONTRAST Additional Contrast? None   Final Result   1. Wall thickening of the rectum with surrounding stranding. Infection and   inflammation are in the differential.   2. Lobulated enlarged uterus may be due to fibroids. Ovaries are not well   visualized. Ultrasound or MRI would better evaluate. 3. Other findings as described. XR CHEST PORTABLE   Final Result   Negative portable chest.         US NON OB TRANSVAGINAL    (Results Pending)           Assessment/Plan:    Active Hospital Problems    Diagnosis     Acute febrile illness [R50.9]     Sepsis ([address] [A41.9]     Abdominal pain [R10.9]     Proctitis [K62.89]      1.  Start Cipro and Flagyl for suspected proctitis  2. F/u blood cx  3. Check Echo for fevers  4. ID consult for fevers  5. GI consult for proctitis  6. Gyn consult for fibroid uterus  7. Check TVUS for fibroid uterus, fever    DVT Prophylaxis: Lovenox  Diet: DIET CLEAR LIQUID;  Code Status: Full Code    PT/OT Eval Status: Not needed    Dispo - Home    Discussed with patient, nursing and CM. D/W mother at bedside. Will see what all think.     Philip Meade MD

## 2021-05-24 NOTE — ED NOTES
PT crying in bed, mother at bedside.   PT medicated and placed on cycling monitors     Cory Mckeon RN  05/24/21 6098

## 2021-05-24 NOTE — PLAN OF CARE
Problem: Activity:  Goal: Risk for activity intolerance will decrease  Description: Risk for activity intolerance will decrease  Outcome: Ongoing     Problem:  Bowel/Gastric:  Goal: Diagnostic test results will improve  Description: Diagnostic test results will improve  Outcome: Ongoing  Goal: Occurrences of nausea will decrease  Description: Occurrences of nausea will decrease  Outcome: Ongoing

## 2021-05-24 NOTE — ED PROVIDER NOTES
[facility]        Pt Name: Shai Jhaveri  MRN: [id number]  [place] 1975  Date of evaluation: 5/23/2021  Provider: BERTA Ceron CNP  PCP: No primary care provider on file. Note Started: 12:18 AM EDT        I have seen and evaluated this patient with my supervising physician Dr. Romel Parker       Chief Complaint   Patient presents with    Abdominal Pain     Pt with c/o pain to lower back that started on Friday, states she's been having fever and chills at home. +N/V, dizziness and headache. HISTORY OF PRESENT ILLNESS   (Location, Timing/Onset, Context/Setting, Quality, Duration, Modifying Factors, Severity, Associated Signs and Symptoms)  Note limiting factors. Shai Jhaveri is a 54 y.o. female who presents to the ED with complaints of fever, nausea, vomiting, and myalgias that have been ongoing for the past 2 days. She was seen at [facility] emergency department 2 days ago for the symptoms. She states she had drank [employer] light beer the night before and then the symptoms had started. She thought at first maybe she had drank too much, however the symptoms remained persistent. She was then concerned she possibly had infection. States she does have a history of GI problems to include slow transit. States she is scheduled for an EGD in the near future. She denies a history of frequent UTIs or pyelonephritis. She received a flu vaccine last season. States she has received her 2nd Moderna COVID-19 vaccine. Patient had called her PCP and informed on the symptoms with a fever of 102. She was sent to the ED for further testing to include a UA and CT abdomen pelvis. She denies any smoking, alcohol use, or street drugs. She does note to having a recent internal hemorrhoid banding done approximately 10 days ago that she has tolerated well.   States she is due for another appointment for an additional internal hemorrhoid banding. Nursing Notes were all reviewed and agreed with or any disagreements were addressed in the HPI. Review of medical records:  Patient was evaluated at an outside hospital on 5/22/2021 at 1 PM.  She was noted to have a fever and dark-colored urine. She was also having associated nausea, dry heaving, abdominal discomfort, low back pain, SOA and chills. A urine sample was not collected. Patient was discharged home with a diagnosis of hypokalemia with a potassium of 3.4. REVIEW OF SYSTEMS    (2-9 systems for level 4, 10 or more for level 5)     Review of Systems    Positives and Pertinent negatives as per HPI. Except as noted above in the ROS, all other systems were reviewed and negative. PAST MEDICAL HISTORY   History reviewed. No pertinent past medical history. SURGICAL HISTORY   History reviewed. No pertinent surgical history. CURRENTMEDICATIONS       Previous Medications    No medications on file         ALLERGIES     Codeine    FAMILYHISTORY       Family History   Family history unknown: Yes          SOCIAL HISTORY       Social History     Tobacco Use    Smoking status: Never Smoker    Smokeless tobacco: Never Used   Substance Use Topics    Alcohol use: Yes    Drug use: Never       SCREENINGS             PHYSICAL EXAM    (up to 7 for level 4, 8 or more for level 5)     ED Triage Vitals [05/23/21 2336]   BP Temp Temp Source Pulse Resp SpO2 Height Weight   129/80 103.2 °F (39.6 °C) Oral 129 24 99 % 5' 5\" (1.651 m) 155 lb (70.3 kg)       Physical Exam  Vitals and nursing note reviewed. Constitutional:       General: She is awake. Appearance: Normal appearance. She is well-developed and overweight. She is ill-appearing. HENT:      Head: Normocephalic and atraumatic. Nose: Nose normal.   Eyes:      General:         Right eye: No discharge. Left eye: No discharge. Cardiovascular:      Rate and Rhythm: Regular rhythm. Tachycardia present. Heart sounds: Normal heart sounds. Pulmonary:      Effort: Pulmonary effort is normal. Tachypnea present. No respiratory distress. Breath sounds: Normal breath sounds. Abdominal:      General: Bowel sounds are normal.      Palpations: Abdomen is soft. Tenderness: There is abdominal tenderness in the epigastric area. There is no right CVA tenderness or left CVA tenderness. Musculoskeletal:         General: Normal range of motion. Cervical back: Normal range of motion. Right lower leg: No edema. Left lower leg: No edema. Skin:     General: Skin is warm and dry. Coloration: Skin is not pale. Neurological:      Mental Status: She is alert and oriented to person, place, and time. Psychiatric:         Behavior: Behavior normal. Behavior is cooperative.          DIAGNOSTIC RESULTS   LABS:    Labs Reviewed   CBC WITH AUTO DIFFERENTIAL - Abnormal; Notable for the following components:       Result Value    WBC 12.2 (*)     Neutrophils Absolute 11.2 (*)     Lymphocytes Absolute 0.4 (*)     All other components within normal limits    Narrative:     Performed at:  [facility] [address], [place], [address] [facility] [employer] [facility] [employer] [facility] [employer]   Phone [phone]   COMPREHENSIVE METABOLIC PANEL - Abnormal; Notable for the following components:    CO2 20 (*)     Glucose 142 (*)     ALT 56 (*)     AST 58 (*)     All other components within normal limits    Narrative:     Performed at:  [facility] [address], [place], [address] [facility] [employer] [facility] [employer]   Phone [phone]   URINE RT REFLEX TO CULTURE - Abnormal; Notable for the following components:    Clarity, UA CLOUDY (*)     Ketones, Urine 15 (*)     pH, UA 8.5 (*)     Protein, UA 30 (*)     Leukocyte Esterase, Urine SMALL (*)     All other components within normal limits    Narrative:     Performed at:  [facility] [address], [place], [address] [facility] [employer] [facility] Ultrasound or MRI would better evaluate. 3. Other findings as described. XR CHEST PORTABLE   Final Result   Negative portable chest.           No results found. PROCEDURES   Unless otherwise noted below, none     Procedures    CRITICAL CARE TIME   N/A    CONSULTS:  IP CONSULT TO HOSPITALIST      EMERGENCY DEPARTMENT COURSE and DIFFERENTIAL DIAGNOSIS/MDM:   Vitals:    Vitals:    05/24/21 0200 05/24/21 0215 05/24/21 0230 05/24/21 0245   BP: 133/69 (!) 141/78 (!) 143/59 (!) 142/77   Pulse:   119 109   Resp:       Temp:    101.8 °F (38.8 °C)   TempSrc:    Axillary   SpO2: 96% 98% 96% 99%   Weight:       Height:           Patient was given the following medications:  Medications   HYDROmorphone HCl PF (DILAUDID) injection 1 mg (1 mg Intravenous Given 5/24/21 0241)   ondansetron (ZOFRAN) injection 4 mg (4 mg Intravenous Given 5/24/21 0241)   0.9 % sodium chloride bolus (1,000 mLs Intravenous New Bag 5/24/21 0239)   lactated ringers infusion (has no administration in time range)   0.9 % sodium chloride bolus (0 mLs Intravenous Stopped 5/24/21 0234)   morphine injection 4 mg (4 mg Intravenous Given 5/24/21 0052)   promethazine (PHENERGAN) injection 25 mg (25 mg Intramuscular Given 5/24/21 0052)   ketorolac (TORADOL) injection 15 mg (15 mg Intravenous Given 5/24/21 0052)   cefTRIAXone (ROCEPHIN) 1000 mg in sterile water 10 mL IV syringe (1,000 mg Intravenous Given 5/24/21 0051)   iopamidol (ISOVUE-370) 76 % injection 75 mL (75 mLs Intravenous Given 5/24/21 0112)           Pertinent Labs & Imaging studies reviewed. (See chart for details)   -  Patient seen and evaluated in the emergency department. -  Triage and nursing notes reviewed and incorporated. -  Old chart records reviewed and incorporated. -  Patient case discussed with attending physician, Dr. Charlene Smith. They saw and examined patient.   -  Differential diagnosis includes:  Pyelonephritis, nephrolithiasis, hydronephrosis, cholangitis, diverticulitis, appendicitis, toxic megacolon, SBI, enterocolitis, dehydration, sepsis, peritonitis, versus COVID-19  -  Work-up included:  See above blood culture x2, UA, CBC, CMP, hCG, lipase, lactic acid  -  ED treatment included:   Normal saline, morphine, Phenergan, Toradol, rocephin  - Consults: Hospitalist  -  Results discussed with patient. Labs show  UA with cloudy clarity, 15 ketones, pH of 8.5, protein 30, 2+ bacteria, WBC 11 with 6 epithelial cells. CBC with WBC 12.2, absolute neutrophils 1.2, absolute size 0.4.  hCG negative. Lactic acid 1.7. CMP with CO2 20, glucose 142, ALT 56 and AST 58 with specimen hemolysis. CXR shows negative portable chest.  CT abdomen pelvis with IV contrast shows a wall thickening of the rectum with surrounding stranding. Infection and inflammation are in the differential.  Lobulated enlarged uterus may be due to fibroids. Ovaries are not well visualized. Ultrasound or MRI would better evaluate. Patient states she is feeling a little bit better. She is requesting additional pain medicine. Informed on the results and need for admission for further testing and treatment at this time. The patient is agreeable with plan of care and disposition.  -  Disposition:   Admission  . CRITICAL CARE TIME   Total Critical Care time was 30 minutes, excluding separately reportable procedures. There was a high probability of clinically significant/life threatening deterioration in the patient's condition which required my urgent intervention. SEP-1 CORE MEASURE DATA    Classification: sepsis    Amount of fluids ordered: at least 30mL/kg based on entered actual weight at time of triage    Time at which sepsis was identified: 0035    Broad-spectrum antibiotics chosen: rocephin based on suspected source of: UTI    Repeat lactate level: pending    On reassessment after fluid resuscitation:   pending, to be completed by inpatient team      FINAL IMPRESSION      1.  Septicemia ([facility] [address] [facility] 2. Proctitis    3. Acute cystitis without hematuria    4.  Abdominal pain, right upper quadrant          DISPOSITION/PLAN   DISPOSITION    Admission       (Please note that portions of this note were completed with a voice recognition program.  Efforts were made to edit the dictations but occasionally words are mis-transcribed.)    BERTA Khan CNP (electronically signed)            BERTA Khan CNP  05/24/21 0324

## 2021-05-24 NOTE — PROGRESS NOTES
Spoke to patients mother, Jesus Ramirez, she was upset given the visitor policy, policy explained. Updated on patient status, questions asked and answered, mother verbalized understanding and was much more accepting of visitor policy by end of phone call.

## 2021-05-24 NOTE — CONSULTS
Gastroenterology Consult Note        Patient: Kim Burton  : 1975  Acct#:      Date:  2021    Subjective:       History of Present Illness  Patient is a 54 y.o.  female admitted with Acute febrile illness [R50.9] who is seen in consult for rectal wall thickening, abdominal pain. She is status post steffanie. Patient reports hemorrhoidal banding 11 days ago with Dr. David Grajeda for history of blood with stools. Has longstanding h/o hemorrhoidal bleeding. 3 days ago she began having abdominal pain, nausea, nonbloody vomiting, chills, muscle aches, fevers, headaches. Pain has been in the right upper quadrant as well as low back, and also had pain in the rectum on Friday. Came to the ER for all the symptoms. Currently feels nauseated. Denies cough or shortness of breath. No diarrhea. Had her 2nd Covid vaccine in April. History reviewed. No pertinent past medical history. History reviewed. No pertinent surgical history. Past Endoscopic History    HISTORY: The patient is a 44year old female with history of   Past Medical History   Diagnosis Date    No Past Medical History     INDICATIONS: Screening colonoscopy    DATE OF OPERATION: 4/15/2013  OPERATIVE SURGEON: Dav Watt MD  ASSISTANT(S): Surgeon(s):  Jeff Almaguer MD  ANESTHESIA: Fentanyl 150 Benadryl 50 Versed 6 Robinul 0.2     PREOPERATIVE DIAGNOSIS: Special screening for malignant neoplasms, colon [V76.51]  POSTOPERATIVE DIAGNOSIS: Incidental polyp    PROCEDURES PERFORMED: Colonoscopy    Procedure Details:   After informed consent was obtained with all risks and benefits of procedure explained and preoperative exam completed, the patient was taken to the endoscopy suite and placed in the left lateral decubitus position.  Upon sequential sedation as per above, a digital rectal exam was performed And was normal. The Olympus videocolonoscope was inserted in the rectum and carefully advanced to alert, cooperative, no distress, appears stated age  Eyes: Anicteric  Head: Normocephalic, without obvious abnormality  Lungs: clear to auscultation bilaterally, Normal Effort  Heart: tachycardia, regular rhythm, normal S1 and S2, no murmurs or rubs  Abdomen: soft, mild RUQ tenderness. Bowel sounds normal. No masses,  no organomegaly. Extremities: atraumatic, no cyanosis or edema  Skin: warm and dry, no jaundice  Neuro: Grossly intact, A&OX3  Musculoskeletal: 5/5  strength BUE      Data Review:    Recent Labs     05/24/21  0030   WBC 12.2*   HGB 13.0   HCT 39.2   MCV 85.1        Recent Labs     05/24/21  0030      K 4.0      CO2 20*   BUN 11   CREATININE 0.7     Recent Labs     05/24/21  0030   AST 58*   ALT 56*   BILITOT 0.8   ALKPHOS 72     Recent Labs     05/24/21  0030   LIPASE 37.0     No results for input(s): PROTIME, INR in the last 72 hours. No results for input(s): PTT in the last 72 hours. No results for input(s): OCCULTBLD in the last 72 hours. Imaging Studies:  CXR 5/24/21  Impression   Negative portable chest.                      CT-scan of abdomen and pelvis w iv contrast 5/24/21  Impression   1. Wall thickening of the rectum with surrounding stranding.  Infection and   inflammation are in the differential.   2. Lobulated enlarged uterus may be due to fibroids.  Ovaries are not well   visualized.  Ultrasound or MRI would better evaluate. 3. Other findings as described.                          Assessment:     Principal Problem:    Proctitis  Active Problems:    Acute febrile illness    Sepsis ([address]    Abdominal pain  Resolved Problems:    * No resolved hospital problems. *    Abdominal pain, nausea, vomiting -for 3 days. CT with wall thickening of the rectum with stranding. ?  If this could be related to recent hemorrhoidal banding or represent proctitis. Noted to have mild elevation of liver enzymes. She is status post cholecystectomy.   Negative for hepatitis A, B, C.  Fever -ID consulted    Recommendations:   -Monitor liver enzymes   -Check right upper quadrant ultrasound to eval for biliary dilation. States she is unable to have an MRCP due to claustrophobia. -Can plan flex sig tomorrow to evaluate rectal wall thickening noted on CT and rule out any proctocolitis    Discussed with Dr. Vijaya Tinajero PA-C  [facility]    I have personally performed a face to face diagnostic evaluation on this patient. I have interviewed and examined the patient and I agree with the findings and recommended plan of care. In summary, my findings and plan are the followin-year-old  female who has intermittent rectal bleeding. She saw Dr. Klever Kyle. He performed hemorrhoidal banding 11 days ago. She presented with 3 days of abdominal pain vomiting, chills, flulike symptoms. CT scan revealed proctitis. Urine analysis suspicious for UTI. Vital signs are stable abdomen is soft nontender. Impression: Rectal bleeding could be from hemorrhoids or proctitis. Continue IV Cipro and Flagyl for now. ID on board. We will plan for flexible sigmoidoscopy to further evaluate.     Osbaldo Santos MD, MSc  [facility]  21

## 2021-05-24 NOTE — CONSULTS
Saundra Lugo MD, Last Rate: 125 mL/hr at 05/24/21 0655, New Bag at 05/24/21 0655    pantoprazole (PROTONIX) tablet 40 mg, 40 mg, Oral, QAM AC, Saundra Lugo MD, 40 mg at 05/24/21 0640    aluminum & magnesium hydroxide-simethicone (MAALOX) 30 mL, lidocaine viscous hcl (XYLOCAINE) 5 mL (GI COCKTAIL), , Oral, Once, Saundra Lugo MD    perflutren lipid microspheres (DEFINITY) injection 1.65 mg, 1.5 mL, Intravenous, ONCE PRN, Rose Marie Chang MD    metronidazole (FLAGYL) 500 mg in NaCl 100 mL IVPB premix, 500 mg, Intravenous, Q8H, Rose Marie Chang MD, Last Rate: 100 mL/hr at 05/24/21 1254, 500 mg at 05/24/21 1254    ciprofloxacin (CIPRO) IVPB 400 mg, 400 mg, Intravenous, Q12H, Rose Marie Chang MD, Stopped at 05/24/21 1247    morphine injection 4 mg, 4 mg, Intravenous, Q3H PRN, Rose Marie Chang MD    promethazine (PHENERGAN) injection 12.5 mg, 12.5 mg, Intravenous, Q4H PRN, Rose Marie Chang MD       Allergies:  Codeine      PHYSICAL EXAM:    Vitals:  /67   Pulse 120   Temp 100.4 °F (38 °C) (Oral)   Resp 18   Ht 5' 5\" (1.651 m)   Wt 155 lb (70.3 kg)   SpO2 100%   BMI 25.79 kg/m²     CONSTITUTIONAL:  awake, alert, distracted and severe distress, holding abdomen and rocking at bedside  PE deferred due to current distress. DATA:    CT: Lobulated enlarged uterus may be due to fibroids    IMPRESSION/RECOMMENDATIONS:      Incidental finding of enlarged uterus on CT. Pt aware of fibroids from OBGYN, asx.  F/U with OBGYN as outpatient as needed.

## 2021-05-24 NOTE — PLAN OF CARE
Ongoing  Goal: Ability to notify healthcare provider of pain before it becomes unmanageable or unbearable will improve  Description: Ability to notify healthcare provider of pain before it becomes unmanageable or unbearable will improve  Outcome: Ongoing  Goal: Pain level will decrease  Description: Pain level will decrease  Outcome: Ongoing     Problem: Pain:  Goal: Pain level will decrease  Description: Pain level will decrease  Outcome: Ongoing  Goal: Control of acute pain  Description: Control of acute pain  Outcome: Ongoing  Goal: Control of chronic pain  Description: Control of chronic pain  Outcome: Ongoing

## 2021-05-24 NOTE — FLOWSHEET NOTE
05/24/21 0656   Assessment   Charting Type Admission   Neurological   Neuro (WDL) WDL   Level of Consciousness Alert (0)   Irving Coma Scale   Eye Opening 4   Best Verbal Response 5   Best Motor Response 6   Irving Coma Scale Score 15   HEENT   HEENT (WDL) WDL   Respiratory   Respiratory (WDL) X   Respiratory Pattern Tachypneic   Respiratory Depth Shallow   Respiratory Quality/Effort Unlabored;Dyspnea with exertion   Chest Assessment Chest expansion symmetrical;Trachea midline   L Breath Sounds Diminished   R Breath Sounds Diminished   Cardiac   Cardiac (WDL) X   Cardiac Regularity Regular   Heart Sounds S1, S2   Cardiac Rhythm ST   Gastrointestinal   Abdominal (WDL) X   GI Symptoms Bloating;Cramping;Distention; Hiccuping;Nausea   Abdomen Inspection Distended   RUQ Bowel Sounds Active   LUQ Bowel Sounds Active   RLQ Bowel Sounds Active   LLQ Bowel Sounds Active   Peripheral Vascular   Peripheral Vascular (WDL) WDL   Skin Color/Condition   Skin Color/Condition (WDL) WDL   Musculoskeletal   Musculoskeletal (WDL) WDL   Genitourinary   Genitourinary (WDL) WDL   Anus/Rectum   Anus/Rectum (WDL) X   Evaluation Other (Comment)  (c/o rectal pain )   Psychosocial   Psychosocial (WDL) WDL

## 2021-05-24 NOTE — PROGRESS NOTES
[employer]:    [id number] [facility] 12 or Facility: [facility] From: Wilbert Larson RE: Heather Manjarrez 1975 RM: [id number] FYI, pt temp up to 103.1, acetaminophen given per [facility]  Need Callback: NO CALLBACK REQ 4T ORTHO/NEURO  Read 4:23 PM

## 2021-05-24 NOTE — PROGRESS NOTES
Physician Progress Note      PATIENTDonald Martinez  CSN #:                  [id number]  :                       1975  ADMIT DATE:       2021 12:15 AM  DISCH DATE:  RESPONDING  PROVIDER #:        Koki Delacruz MD          QUERY TEXT:    Pt admitted with possible viral illness. Pt noted to have fever, tachycardia,   and elevated WBC. If possible, please document in the progress notes and   discharge summary if you are evaluating and /or treating any of the following: The medical record reflects the following:  Risk Factors: states she's been having fever and chills at home. +N/V,   dizziness and headache. Clinical Indicators: Temp 103.2, , WBC 12.2, Neutrophils 11.2, possible   cystitis, possible proctitis; per ED: Septicemia  Treatment: IV Fluids, IV Rocephin, IV Cipro,  Options provided:  -- Sepsis, present on admission  -- Sepsis, present on admission, now resolved  -- Sepsis, not present on admission but developed during stay  -- No Sepsis, localized or viral infection only  -- Sepsis was ruled out  -- Other - I will add my own diagnosis  -- Disagree - Not applicable / Not valid  -- Disagree - Clinically unable to determine / Unknown  -- Refer to Clinical Documentation Reviewer    PROVIDER RESPONSE TEXT:    This patient has sepsis which was present on admission.     Query created by: Reece Ugarte on 2021 10:46 AM      Electronically signed by:  Koki Delacruz MD 2021 12:31 PM

## 2021-05-24 NOTE — PROGRESS NOTES
[employer]:    [address] [facility] 12 or Facility: [facility] From: Matthew Wesley RE: Shona Lemus 1975 RM: [id number] Pt is experiencing nausea and vomiting, Zofran given per [facility], but patient is concerned she wont be able to keep the oral phenegran down. Can we try IV? Thanks!  Need Callback: NO CALLBACK REQ 4T ORTHO/NEURO  Read 12:30 PM

## 2021-05-24 NOTE — PROGRESS NOTES
Patient complained of pan 7/10 in the head and abdomen, also c/o nausea with small amount of emesis, patient is visibly uncomfortable, agitated, restless and crying. IVP morphine and IVP phenegran given per [facility].

## 2021-05-24 NOTE — ACP (ADVANCE CARE PLANNING)
Advanced Care Planning Note. Purpose of Encounter: Advanced care planning in light of proctitis  Parties In Attendance: Patient, mother  Decisional Capacity: Yes  Subjective: Patient with fevers  Objective: Cr 0.7  Goals of Care Determination: Patient wants full support (CPR, vent, surgery, HD, trach, PEG)  Plan:  IV Abx, Echo, ID/GI/Gyn consults, TVUS  Code Status: Full code   Time spent on Advanced care Plannin minutes  Advanced Care Planning Documents: Completed advanced directives on chart, mother is the POA.     Clau Polo MD  2021 7:52 AM

## 2021-05-24 NOTE — H&P
greater than 5 epithelial cells); also has leukocytosis of 12,200, slightly elevated LFTs. CT-abdomen/pelvis reveals rectal wall thickening concerning for inflammation or infection, lobulated enlarged uterus concerning for fibroid. Chest x-ray is unremarkable. Lipase is normal.    Past Medical History:  History reviewed. No pertinent past medical history. Past Surgical History:  History reviewed. No pertinent surgical history. Medications (prior to admission):  Prior to Admission medications    Not on File       Allergy(ies):  Codeine    Social History:  TOBACCO:  reports that she has never smoked. She has never used smokeless tobacco.  ETOH:  reports current alcohol use. Family History:      Family history unknown: Yes       Review of Systems:  Pertinent positives are listed in HPI. At least 10-point ROS reviewed and were negative. Vitals and physical examination:  BP (!) 143/59   Pulse 119   Temp 102.8 °F (39.3 °C) (Axillary)   Resp 24   Ht 5' 5\" (1.651 m)   Wt 155 lb (70.3 kg)   SpO2 96%   BMI 25.79 kg/m²   Gen/overall appearance: Not in acute distress. Alert. Oriented x3. Head: Normocephalic, atraumatic  Eyes: EOMI, good acuity  ENT: Oral mucosa moist  Neck: No stiffness. Negative jolt test.  No JVD, thyromegaly  CVS: Nml S1S2, no MRG. Tachycardic. Pulm: Clear bilaterally. No crackles/wheezes  Gastrointestinal: Mild tenderness to palpation in epigastrium, right upper quadrant. Soft, ND, +BS  Musculoskeletal: No edema. Warm  Neuro: No focal deficit. Moves extremity spontaneously. Psychiatry: Appropriate affect. Not agitated. Skin: Warm, dry with normal turgor.  No rash  Capillary refill: Brisk,< 3 seconds   Peripheral Pulses: +2 palpable, equal bilaterally       Labs/imaging/EKG:  CBC:   Recent Labs     05/24/21  0030   WBC 12.2*   HGB 13.0        BMP:    Recent Labs     05/24/21  0030      K 4.0      CO2 20*   BUN 11   CREATININE 0.7   GLUCOSE 142*     Hepatic: Recent Labs     05/24/21  0030   AST 58*   ALT 56*   BILITOT 0.8   ALKPHOS 72       CT ABDOMEN PELVIS W IV CONTRAST Additional Contrast? None    Result Date: 5/24/2021  EXAMINATION: CT OF THE ABDOMEN AND PELVIS WITH CONTRAST 5/24/2021 1:04 am TECHNIQUE: CT of the abdomen and pelvis was performed with the administration of intravenous contrast. Multiplanar reformatted images are provided for review. Dose modulation, iterative reconstruction, and/or weight based adjustment of the mA/kV was utilized to reduce the radiation dose to as low as reasonably achievable. COMPARISON: None. HISTORY: ORDERING SYSTEM PROVIDED HISTORY: abd pain, n/v/ fever TECHNOLOGIST PROVIDED HISTORY: Additional Contrast?->None Reason for exam:->abd pain, n/v/ fever Decision Support Exception - unselect if not a suspected or confirmed emergency medical condition->Emergency Medical Condition (MA) Reason for Exam: Abdominal Pain (Pt with c/o pain to lower back that started on Friday, states she's been having fever and chills at home. +N/V, dizziness and headache.) Acuity: Acute Type of Exam: Initial FINDINGS: Lower Chest: Lung bases demonstrate no focal consolidation or pleural effusion. Organs: Liver: Unremarkable on this phase of enhancement. Spleen: Unremarkable on this phase of enhancement. Pancreas: Unremarkable on this phase of enhancement. Adrenal glands: Unremarkable on this phase of enhancement. Kidneys: Unremarkable on this phase of enhancement. No renal, ureteral, or bladder calculi. Gallbladder: Surgically absent. GI/Bowel: Evaluation of the bowel and mesentery is limited due to lack of enteric contrast. Visualized esophagus/stomach: Unremarkable given lack of enteric contrast and degree of distension. Small bowel: No dilated small bowel. Large bowel: Scattered colonic diverticula without adjacent stranding. Wall thickening of the rectum with surrounding stranding. Appendix: Normal. Pelvis: Bladder is incompletely distended.  Lobulated enlarged uterus may be due to fibroids. Ovaries are not well visualized. Peritoneum/Retroperitoneum: Adenopathy: Suboptimal evaluation for adenopathy due to lack of enteric contrast. Abdominal aorta: No abdominal aortic aneurysm. Free fluid/air: Small free fluid. No free air. Bones/Soft Tissues: Scattered degenerative changes noted in the visualized spine without spondylolisthesis. 1. Wall thickening of the rectum with surrounding stranding. Infection and inflammation are in the differential. 2. Lobulated enlarged uterus may be due to fibroids. Ovaries are not well visualized. Ultrasound or MRI would better evaluate. 3. Other findings as described. XR CHEST PORTABLE    Result Date: 5/24/2021  EXAMINATION: ONE XRAY VIEW OF THE CHEST 5/24/2021 12:44 am COMPARISON: None. HISTORY: ORDERING SYSTEM PROVIDED HISTORY: cough, fever TECHNOLOGIST PROVIDED HISTORY: Reason for exam:->cough, fever FINDINGS: The lungs are clear. The costophrenic angles are sharp. The cardiomediastinal silhouette is within normal limits. There is no discernible pneumothorax. Negative portable chest.       Discussed with ER provider. Thank you No primary care provider on file.  for the opportunity to be involved in this patient's care.    -----------------------------  Lida Loera MD  [facility] Emergency Department hospitalist

## 2021-05-24 NOTE — CONSULTS
Infectious Diseases   Consult Note        Admission Date: 5/24/2021  Hospital Day: Hospital Day: 1   Attending: María Leyva MD  Date of service: 5/24/21     Reason for admission: Acute febrile illness [R50.9]    Chief complaint/ Reason for consult: Sepsis    Microbiology:        I have reviewed allavailable micro lab data and cultures    · Blood culture (2/2) - collected on 5/24/2021: In process  · Urine culture  - collected on 5/24/2021: In process      Antibiotics and immunizations:       Current antibiotics: All antibiotics and their doses were reviewed by me    Recent Abx Admin                   ciprofloxacin (CIPRO) IVPB 400 mg (mg) 400 mg New Bag 05/24/21 1139    cefTRIAXone (ROCEPHIN) 1000 mg in sterile water 10 mL IV syringe (mg) 1,000 mg Given 05/24/21 0051                  Immunization History: All immunization history was reviewed by me today. There is no immunization history on file for this patient. Known drug allergies: All allergies were reviewed and updated    Allergies   Allergen Reactions    Codeine Nausea Only       Social history:     Social History:  All social andepidemiologic history was reviewed and updated by me today as needed. · Tobacco use:   reports that she has never smoked. She has never used smokeless tobacco.  · Alcohol use:   reports current alcohol use. · Currently lives in: [facility]  ·  reports no history of drug use. COVID VACCINATION AND LAB RESULT RECORDS:     Internal Administration   First Dose      Second Dose           Last COVID Lab No results found for: SARS-COV-2, SARS-COV-2 RNA, SARS-COV-2, SARS-COV-2, SARS-COV-2 BY PCR, SARS-COV-2, SARS-COV-2, SARS-COV-2         Assessment:     The patient is a 54 y.o. old female who  has no past medical history on file.  with following problems:    · Sepsis with high fever, leukocytosis, tachycardia and tachypnea  · CT abdomen pelvis concerning for proctitis  · Urinary tract infection  · Transaminitis  · Overweight due to excess calorie intake : Body mass index is 25.79 kg/m². Discussion:      The patient has been admitted with high fever and leukocytosis. Rapid flu screen in the ER was negative. Urinalysis on admission showed small leukocyte esterase and 2+ bacteria but negative nitrite. Blood cultures and urine cultures are in process. I reviewed the images of CT scan of abdomen and pelvis. There was concern for possible proctitis. The patient has received empiric IV ceftriaxone in the ER and is currently on empiric IV ciprofloxacin    Urine pregnancy screen was negative    Patient complains of headache, flank pain, dysuria. She had hemorrhoid surgery done 2 weeks ago and has occasional bleeding in that area. Underlying bacteremia and pyelonephritis and proctitis suspected    Plan:     Diagnostic Workup:    · Agree with blood cultures  · Follow-up on COVID-19 test  · Follow-up on urine culture  · Continue to follow fever curve, WBC count and blood cultures  · Follow up on liver and renal functions closely  · Will order urine tox screen    Antimicrobials:    · Will continue empiric IV ciprofloxacin 400 mg every 12 hours  · Will continue empiric IV Flagyl 500 mg every 8 hour for anaerobic coverage, in case she does have proctitis  · We will follow up on the culture results and clinical progress and will make further recommendations accordingly. · Continue close vitals monitoring. · Maintain good glycemic control. · Fall precautions. Aspiration precautions. · Continue to watch for new fever or diarrhea. · DVT prophylaxis. · Discussed all above with patient and RN. Drug Monitoring:    · Continue serial monitoring for antibiotic toxicity as follows: CBC, CMP, QTc interval  · Continue to watch for following: new or worsening fever, hypotension, hives, lip swelling and redness or purulence at vascular access sites.     I/v access Management:    · Continue stranding. I have been asked for my opinion for management for this patient. Past Medical History: All past medical history reviewed today. History reviewed. No pertinent past medical history. Past Surgical History: All pastsurgical history was reviewed today. History reviewed. No pertinent surgical history. Family History: All family history was reviewed today. Family history unknown: Yes         Medications: All current and past medications were reviewed. No medications prior to admission.  sodium chloride flush  5-40 mL Intravenous 2 times per day    enoxaparin  40 mg Subcutaneous Daily    pantoprazole  40 mg Oral QAM AC    GI cocktail   Oral Once    metroNIDAZOLE  500 mg Intravenous Q8H    ciprofloxacin  400 mg Intravenous Q12H          REVIEW OF SYSTEMS:       Review of Systems   Constitutional: Positive for fatigue and fever. Negative for chills, diaphoresis and unexpected weight change. HENT: Negative for congestion, ear discharge, ear pain, facial swelling, hearing loss, rhinorrhea and trouble swallowing. Eyes: Negative for photophobia, discharge, redness and visual disturbance. Respiratory: Negative for apnea, cough, choking, chest tightness, shortness of breath and stridor. Cardiovascular: Negative for chest pain and palpitations. Gastrointestinal: Positive for anal bleeding, blood in stool, nausea and rectal pain. Negative for abdominal pain and diarrhea. Endocrine: Negative for polydipsia, polyphagia and polyuria. Genitourinary: Positive for dysuria and flank pain. Negative for difficulty urinating, frequency, hematuria, menstrual problem and vaginal discharge. Musculoskeletal: Negative for arthralgias, joint swelling, myalgias and neck stiffness. Skin: Negative for color change and rash. Allergic/Immunologic: Negative for immunocompromised state. Neurological: Positive for headaches.  Negative for dizziness, seizures, speech difficulty and light-headedness. Hematological: Negative for adenopathy. Psychiatric/Behavioral: Negative for agitation, hallucinations and suicidal ideas. Objective:       PHYSICAL EXAM:      Vitals:   Vitals:    05/24/21 0600 05/24/21 0615 05/24/21 1015 05/24/21 1215   BP: 138/75 116/64 100/66 114/67   Pulse: 98 124 110 120   Resp: 20 20 18 18   Temp:  100.5 °F (38.1 °C) 100.3 °F (37.9 °C) 100.4 °F (38 °C)   TempSrc:  Oral Oral Oral   SpO2: 98% 98% 98% 100%   Weight:       Height:           Physical Exam  Vitals and nursing note reviewed. Constitutional:       General: She is not in acute distress. Appearance: She is well-developed. She is not diaphoretic. Comments: Febrile at time of exam   HENT:      Head: Normocephalic. Right Ear: External ear normal.      Left Ear: External ear normal.      Nose: Nose normal.   Eyes:      General: No scleral icterus. Right eye: No discharge. Left eye: No discharge. Conjunctiva/sclera: Conjunctivae normal.      Pupils: Pupils are equal, round, and reactive to light. Cardiovascular:      Rate and Rhythm: Normal rate and regular rhythm. Heart sounds: No murmur heard. No friction rub. Pulmonary:      Effort: Pulmonary effort is normal.      Breath sounds: No stridor. No wheezing or rales. Chest:      Chest wall: No tenderness. Abdominal:      Palpations: Abdomen is soft. There is no mass. Tenderness: There is no abdominal tenderness. There is left CVA tenderness. There is no guarding or rebound. Musculoskeletal:         General: No tenderness. Cervical back: Normal range of motion and neck supple. Lymphadenopathy:      Cervical: No cervical adenopathy. Skin:     General: Skin is warm and dry. Findings: No erythema or rash. Neurological:      Mental Status: She is alert and oriented to person, place, and time. Motor: No abnormal muscle tone.    Psychiatric:         Judgment: Judgment normal. Abdominal pain, right upper quadrant R10.11    Transaminitis R74.01    Overweight (BMI 25.0-29. 9) E66.3         Please note that this chart was generated using Dragon dictation software. Although every effort was made to ensure the accuracy of this automated transcription, some errors in transcription may have occurred inadvertently. If you may need any clarification, please do not hesitate to contact me through EPIC or at the phone number provided below with my electronic signature. Any pictures or media included in this note were obtained after taking informed verbal consent from the patient and with their approval to include those in the patient's medical record.       Johnathon Lesch, MD, MPH  5/24/21, 12:37 PM EDT   [facility] Infectious Disease   [address] [place] [address], [address] [place] [address]  Office: [phone]  Fax: [phone]  Clinic days:  Tuesday & Thursday

## 2021-05-24 NOTE — PROGRESS NOTES
Patient rates pain at 0/10, is calm and comfortable in bed. Patient states she is satisfied with treatment.

## 2021-05-25 ENCOUNTER — ANESTHESIA (OUTPATIENT)
Dept: ENDOSCOPY | Age: 46
DRG: 871 | End: 2021-05-25
Payer: COMMERCIAL

## 2021-05-25 ENCOUNTER — APPOINTMENT (OUTPATIENT)
Dept: ULTRASOUND IMAGING | Age: 46
DRG: 871 | End: 2021-05-25
Payer: COMMERCIAL

## 2021-05-25 ENCOUNTER — ANESTHESIA EVENT (OUTPATIENT)
Dept: ENDOSCOPY | Age: 46
DRG: 871 | End: 2021-05-25
Payer: COMMERCIAL

## 2021-05-25 VITALS — OXYGEN SATURATION: 96 % | SYSTOLIC BLOOD PRESSURE: 110 MMHG | DIASTOLIC BLOOD PRESSURE: 56 MMHG

## 2021-05-25 LAB
A/G RATIO: 1.2 (ref 1.1–2.2)
ALBUMIN SERPL-MCNC: 3.4 G/DL (ref 3.4–5)
ALP BLD-CCNC: 82 U/L (ref 40–129)
ALT SERPL-CCNC: 54 U/L (ref 10–40)
ANION GAP SERPL CALCULATED.3IONS-SCNC: 11 MMOL/L (ref 3–16)
AST SERPL-CCNC: 41 U/L (ref 15–37)
BASOPHILS ABSOLUTE: 0 /ΜL
BASOPHILS ABSOLUTE: 0 K/UL (ref 0–0.2)
BASOPHILS RELATIVE PERCENT: 0 %
BASOPHILS RELATIVE PERCENT: 0.1 %
BILIRUB SERPL-MCNC: 1 MG/DL (ref 0–1)
BUN BLDV-MCNC: 14 MG/DL (ref 7–20)
CALCIUM SERPL-MCNC: 8.3 MG/DL (ref 8.3–10.6)
CHLORIDE BLD-SCNC: 102 MMOL/L (ref 99–110)
CO2: 22 MMOL/L (ref 21–32)
CREAT SERPL-MCNC: 0.7 MG/DL (ref 0.6–1.1)
EOSINOPHILS ABSOLUTE: 0 K/UL (ref 0–0.6)
EOSINOPHILS ABSOLUTE: 0.2 /ΜL
EOSINOPHILS RELATIVE PERCENT: 0 %
EOSINOPHILS RELATIVE PERCENT: 1 %
GFR AFRICAN AMERICAN: >60
GFR NON-AFRICAN AMERICAN: >60
GLOBULIN: 2.9 G/DL
GLUCOSE BLD-MCNC: 121 MG/DL (ref 70–99)
HCG(URINE) PREGNANCY TEST: NEGATIVE
HCT VFR BLD CALC: 31.2 % (ref 36–46)
HCT VFR BLD CALC: 33.8 % (ref 36–48)
HEMOGLOBIN: 10.2 G/DL (ref 12–16)
HEMOGLOBIN: 11 G/DL (ref 12–16)
LV EF: 55 %
LVEF MODALITY: NORMAL
LYMPHOCYTES ABSOLUTE: 0.6 K/UL (ref 1–5.1)
LYMPHOCYTES ABSOLUTE: 1.3 /ΜL
LYMPHOCYTES RELATIVE PERCENT: 11 %
LYMPHOCYTES RELATIVE PERCENT: 4.1 %
MAGNESIUM: 1.6 MG/DL (ref 1.8–2.4)
MCH RBC QN AUTO: 28.2 PG (ref 26–34)
MCH RBC QN AUTO: 29.4 PG
MCHC RBC AUTO-ENTMCNC: 32.5 G/DL (ref 31–36)
MCHC RBC AUTO-ENTMCNC: 32.7 G/DL
MCV RBC AUTO: 86.8 FL (ref 80–100)
MCV RBC AUTO: 89.8 FL
MONOCYTES ABSOLUTE: 1 /ΜL
MONOCYTES ABSOLUTE: 1.1 K/UL (ref 0–1.3)
MONOCYTES RELATIVE PERCENT: 7.9 %
MONOCYTES RELATIVE PERCENT: 9 %
NEUTROPHILS ABSOLUTE: 12.7 K/UL (ref 1.7–7.7)
NEUTROPHILS ABSOLUTE: 9.1 /ΜL
NEUTROPHILS RELATIVE PERCENT: 87.9 %
NEUTROPHILS RELATIVE PERCENT: ABNORMAL
PDW BLD-RTO: 14 %
PDW BLD-RTO: 14 % (ref 12.4–15.4)
PHOSPHORUS: 1.9 MG/DL (ref 2.5–4.9)
PLATELET # BLD: 128 K/UL (ref 135–450)
PLATELET # BLD: 291 K/ΜL
PMV BLD AUTO: 8.3 FL (ref 5–10.5)
PMV BLD AUTO: 8.7 FL
POTASSIUM SERPL-SCNC: 3.5 MMOL/L (ref 3.5–5.1)
RBC # BLD: 3.47 10^6/ΜL
RBC # BLD: 3.9 M/UL (ref 4–5.2)
SODIUM BLD-SCNC: 135 MMOL/L (ref 136–145)
TOTAL PROTEIN: 6.3 G/DL (ref 6.4–8.2)
URINE CULTURE, ROUTINE: NORMAL
WBC # BLD: 11.5 10^3/ML
WBC # BLD: 14.4 K/UL (ref 4–11)

## 2021-05-25 PROCEDURE — 6360000002 HC RX W HCPCS: Performed by: INTERNAL MEDICINE

## 2021-05-25 PROCEDURE — 2580000003 HC RX 258: Performed by: INTERNAL MEDICINE

## 2021-05-25 PROCEDURE — 0DJD8ZZ INSPECTION OF LOWER INTESTINAL TRACT, VIA NATURAL OR ARTIFICIAL OPENING ENDOSCOPIC: ICD-10-PCS | Performed by: INTERNAL MEDICINE

## 2021-05-25 PROCEDURE — 76705 ECHO EXAM OF ABDOMEN: CPT

## 2021-05-25 PROCEDURE — 2500000003 HC RX 250 WO HCPCS: Performed by: INTERNAL MEDICINE

## 2021-05-25 PROCEDURE — 2500000003 HC RX 250 WO HCPCS: Performed by: NURSE ANESTHETIST, CERTIFIED REGISTERED

## 2021-05-25 PROCEDURE — 36415 COLL VENOUS BLD VENIPUNCTURE: CPT

## 2021-05-25 PROCEDURE — 2700000000 HC OXYGEN THERAPY PER DAY

## 2021-05-25 PROCEDURE — 3700000001 HC ADD 15 MINUTES (ANESTHESIA): Performed by: INTERNAL MEDICINE

## 2021-05-25 PROCEDURE — 84703 CHORIONIC GONADOTROPIN ASSAY: CPT

## 2021-05-25 PROCEDURE — 6360000002 HC RX W HCPCS: Performed by: NURSE ANESTHETIST, CERTIFIED REGISTERED

## 2021-05-25 PROCEDURE — 7100000000 HC PACU RECOVERY - FIRST 15 MIN: Performed by: INTERNAL MEDICINE

## 2021-05-25 PROCEDURE — 83735 ASSAY OF MAGNESIUM: CPT

## 2021-05-25 PROCEDURE — 2580000003 HC RX 258: Performed by: NURSE ANESTHETIST, CERTIFIED REGISTERED

## 2021-05-25 PROCEDURE — 85025 COMPLETE CBC W/AUTO DIFF WBC: CPT

## 2021-05-25 PROCEDURE — 76830 TRANSVAGINAL US NON-OB: CPT

## 2021-05-25 PROCEDURE — 94761 N-INVAS EAR/PLS OXIMETRY MLT: CPT

## 2021-05-25 PROCEDURE — 3609008400 HC SIGMOIDOSCOPY DIAGNOSTIC: Performed by: INTERNAL MEDICINE

## 2021-05-25 PROCEDURE — 7100000001 HC PACU RECOVERY - ADDTL 15 MIN: Performed by: INTERNAL MEDICINE

## 2021-05-25 PROCEDURE — 93306 TTE W/DOPPLER COMPLETE: CPT

## 2021-05-25 PROCEDURE — 6370000000 HC RX 637 (ALT 250 FOR IP): Performed by: INTERNAL MEDICINE

## 2021-05-25 PROCEDURE — 1200000000 HC SEMI PRIVATE

## 2021-05-25 PROCEDURE — 99233 SBSQ HOSP IP/OBS HIGH 50: CPT | Performed by: INTERNAL MEDICINE

## 2021-05-25 PROCEDURE — 3700000000 HC ANESTHESIA ATTENDED CARE: Performed by: INTERNAL MEDICINE

## 2021-05-25 PROCEDURE — 80053 COMPREHEN METABOLIC PANEL: CPT

## 2021-05-25 PROCEDURE — 2709999900 HC NON-CHARGEABLE SUPPLY: Performed by: INTERNAL MEDICINE

## 2021-05-25 PROCEDURE — 84100 ASSAY OF PHOSPHORUS: CPT

## 2021-05-25 RX ORDER — PROPOFOL 10 MG/ML
INJECTION, EMULSION INTRAVENOUS PRN
Status: DISCONTINUED | OUTPATIENT
Start: 2021-05-25 | End: 2021-05-25 | Stop reason: SDUPTHER

## 2021-05-25 RX ORDER — SODIUM CHLORIDE 9 MG/ML
INJECTION, SOLUTION INTRAVENOUS CONTINUOUS PRN
Status: DISCONTINUED | OUTPATIENT
Start: 2021-05-25 | End: 2021-05-25 | Stop reason: SDUPTHER

## 2021-05-25 RX ORDER — LIDOCAINE HYDROCHLORIDE 20 MG/ML
INJECTION, SOLUTION INFILTRATION; PERINEURAL PRN
Status: DISCONTINUED | OUTPATIENT
Start: 2021-05-25 | End: 2021-05-25 | Stop reason: SDUPTHER

## 2021-05-25 RX ORDER — PEG-3350, SODIUM SULFATE, SODIUM CHLORIDE, POTASSIUM CHLORIDE, SODIUM ASCORBATE AND ASCORBIC ACID 7.5-2.691G
KIT ORAL ONCE
Status: COMPLETED | OUTPATIENT
Start: 2021-05-25 | End: 2021-05-25

## 2021-05-25 RX ADMIN — METRONIDAZOLE 500 MG: 500 INJECTION, SOLUTION INTRAVENOUS at 02:43

## 2021-05-25 RX ADMIN — PROPOFOL 40 MG: 10 INJECTION, EMULSION INTRAVENOUS at 10:29

## 2021-05-25 RX ADMIN — MORPHINE SULFATE 4 MG: 4 INJECTION, SOLUTION INTRAMUSCULAR; INTRAVENOUS at 00:44

## 2021-05-25 RX ADMIN — PROMETHAZINE HYDROCHLORIDE 12.5 MG: 25 INJECTION INTRAMUSCULAR; INTRAVENOUS at 04:52

## 2021-05-25 RX ADMIN — POLYETHYLENE GLYCOL 3350, SODIUM SULFATE, SODIUM CHLORIDE, POTASSIUM CHLORIDE, ASCORBIC ACID, SODIUM ASCORBATE: KIT at 14:34

## 2021-05-25 RX ADMIN — ACETAMINOPHEN 650 MG: 325 TABLET ORAL at 21:50

## 2021-05-25 RX ADMIN — SODIUM CHLORIDE, POTASSIUM CHLORIDE, SODIUM LACTATE AND CALCIUM CHLORIDE: 600; 310; 30; 20 INJECTION, SOLUTION INTRAVENOUS at 02:41

## 2021-05-25 RX ADMIN — CIPROFLOXACIN 400 MG: 2 INJECTION, SOLUTION INTRAVENOUS at 14:15

## 2021-05-25 RX ADMIN — MEROPENEM 1000 MG: 1 INJECTION, POWDER, FOR SOLUTION INTRAVENOUS at 18:52

## 2021-05-25 RX ADMIN — ONDANSETRON 4 MG: 2 INJECTION INTRAMUSCULAR; INTRAVENOUS at 00:43

## 2021-05-25 RX ADMIN — Medication 10 ML: at 00:44

## 2021-05-25 RX ADMIN — PROPOFOL 40 MG: 10 INJECTION, EMULSION INTRAVENOUS at 10:33

## 2021-05-25 RX ADMIN — PROMETHAZINE HYDROCHLORIDE 12.5 MG: 25 INJECTION INTRAMUSCULAR; INTRAVENOUS at 21:49

## 2021-05-25 RX ADMIN — ONDANSETRON 4 MG: 2 INJECTION INTRAMUSCULAR; INTRAVENOUS at 12:07

## 2021-05-25 RX ADMIN — SODIUM CHLORIDE: 9 INJECTION, SOLUTION INTRAVENOUS at 10:20

## 2021-05-25 RX ADMIN — SODIUM CHLORIDE, POTASSIUM CHLORIDE, SODIUM LACTATE AND CALCIUM CHLORIDE: 600; 310; 30; 20 INJECTION, SOLUTION INTRAVENOUS at 15:50

## 2021-05-25 RX ADMIN — SODIUM CHLORIDE 25 ML: 9 INJECTION, SOLUTION INTRAVENOUS at 18:51

## 2021-05-25 RX ADMIN — PROPOFOL 100 MG: 10 INJECTION, EMULSION INTRAVENOUS at 10:24

## 2021-05-25 RX ADMIN — LIDOCAINE HYDROCHLORIDE 100 MG: 20 INJECTION, SOLUTION INFILTRATION; PERINEURAL at 10:24

## 2021-05-25 RX ADMIN — Medication 10 ML: at 21:51

## 2021-05-25 RX ADMIN — PANTOPRAZOLE SODIUM 40 MG: 40 TABLET, DELAYED RELEASE ORAL at 05:06

## 2021-05-25 RX ADMIN — PROMETHAZINE HYDROCHLORIDE 12.5 MG: 25 INJECTION INTRAMUSCULAR; INTRAVENOUS at 12:07

## 2021-05-25 RX ADMIN — PROPOFOL 20 MG: 10 INJECTION, EMULSION INTRAVENOUS at 10:28

## 2021-05-25 RX ADMIN — MORPHINE SULFATE 4 MG: 4 INJECTION, SOLUTION INTRAMUSCULAR; INTRAVENOUS at 04:52

## 2021-05-25 RX ADMIN — METRONIDAZOLE 500 MG: 500 INJECTION, SOLUTION INTRAVENOUS at 12:17

## 2021-05-25 RX ADMIN — MORPHINE SULFATE 4 MG: 4 INJECTION, SOLUTION INTRAMUSCULAR; INTRAVENOUS at 21:50

## 2021-05-25 RX ADMIN — MORPHINE SULFATE 4 MG: 4 INJECTION, SOLUTION INTRAMUSCULAR; INTRAVENOUS at 12:07

## 2021-05-25 RX ADMIN — Medication 10 ML: at 12:17

## 2021-05-25 RX ADMIN — SODIUM CHLORIDE 25 ML: 9 INJECTION, SOLUTION INTRAVENOUS at 12:16

## 2021-05-25 RX ADMIN — ACETAMINOPHEN 650 MG: 325 TABLET ORAL at 16:02

## 2021-05-25 RX ADMIN — ACETAMINOPHEN 650 MG: 325 TABLET ORAL at 04:52

## 2021-05-25 RX ADMIN — PHENYLEPHRINE HYDROCHLORIDE 100 MCG: 10 INJECTION INTRAVENOUS at 10:30

## 2021-05-25 ASSESSMENT — PAIN DESCRIPTION - LOCATION
LOCATION: ABDOMEN;BACK;HEAD
LOCATION: ABDOMEN;BACK;HEAD

## 2021-05-25 ASSESSMENT — PULMONARY FUNCTION TESTS
PIF_VALUE: 1

## 2021-05-25 ASSESSMENT — ENCOUNTER SYMPTOMS
EYE REDNESS: 0
PHOTOPHOBIA: 0
BLOOD IN STOOL: 0
CHEST TIGHTNESS: 0
COLOR CHANGE: 0
TROUBLE SWALLOWING: 0
FACIAL SWELLING: 0
NAUSEA: 0
DIARRHEA: 0
RHINORRHEA: 0
EYE DISCHARGE: 0
STRIDOR: 0
SHORTNESS OF BREATH: 0
APNEA: 0
COUGH: 0
CHOKING: 0
ABDOMINAL PAIN: 1

## 2021-05-25 ASSESSMENT — PAIN SCALES - GENERAL
PAINLEVEL_OUTOF10: 7
PAINLEVEL_OUTOF10: 8
PAINLEVEL_OUTOF10: 0
PAINLEVEL_OUTOF10: 7
PAINLEVEL_OUTOF10: 4
PAINLEVEL_OUTOF10: 3
PAINLEVEL_OUTOF10: 0

## 2021-05-25 ASSESSMENT — PAIN DESCRIPTION - PAIN TYPE
TYPE: ACUTE PAIN
TYPE: ACUTE PAIN

## 2021-05-25 ASSESSMENT — PAIN DESCRIPTION - ONSET
ONSET: ON-GOING
ONSET: ON-GOING

## 2021-05-25 ASSESSMENT — PAIN - FUNCTIONAL ASSESSMENT
PAIN_FUNCTIONAL_ASSESSMENT: PREVENTS OR INTERFERES SOME ACTIVE ACTIVITIES AND ADLS
PAIN_FUNCTIONAL_ASSESSMENT: PREVENTS OR INTERFERES SOME ACTIVE ACTIVITIES AND ADLS

## 2021-05-25 ASSESSMENT — PAIN DESCRIPTION - DESCRIPTORS
DESCRIPTORS: ACHING;HEADACHE
DESCRIPTORS: ACHING;HEADACHE

## 2021-05-25 ASSESSMENT — PAIN DESCRIPTION - FREQUENCY
FREQUENCY: CONTINUOUS
FREQUENCY: CONTINUOUS

## 2021-05-25 NOTE — PROGRESS NOTES
Pt arrived from endo to PACU bay 4. Report received from endo staff. Pt arouses to voice. Pt on RA, ST, VSS. Will continue to monitor.

## 2021-05-25 NOTE — PROGRESS NOTES
Pt transferred into room 5904. Report given to [facility] RN, VSS and pt stable.

## 2021-05-25 NOTE — PROGRESS NOTES
Patient mother bedside most of day, all questions asked and answered, both patient and mother have verbalized understanding. Patient currently in ultrasound via transport.

## 2021-05-25 NOTE — PROGRESS NOTES
Patient arrived from [address] [facility] via wheelchair to room 5904. Patient able to ambulate to bed. Patient oriented to room and call light. Bed locked and in low position. Call light and bedside table within reach.

## 2021-05-25 NOTE — PLAN OF CARE
Problem: Activity:  Goal: Risk for activity intolerance will decrease  Description: Risk for activity intolerance will decrease  5/24/2021 2038 by Jeana Salas RN  Outcome: Ongoing     Problem: Bowel/Gastric:  Goal: Bowel function will improve  Description: Bowel function will improve  5/24/2021 2038 by Jeana Salas RN  Outcome: Ongoing     Problem: Bowel/Gastric:  Goal: Diagnostic test results will improve  Description: Diagnostic test results will improve  5/24/2021 2038 by Jeana Salas RN  Outcome: Ongoing     Problem: Bowel/Gastric:  Goal: Occurrences of nausea will decrease  Description: Occurrences of nausea will decrease  5/24/2021 2038 by Jeana Salas RN  Outcome: Ongoing   PRN Phenergan given for nausea. Problem: Bowel/Gastric:  Goal: Occurrences of vomiting will decrease  Description: Occurrences of vomiting will decrease  5/24/2021 2038 by Jeana Salas RN  Outcome: Ongoing     Problem: Fluid Volume:  Goal: Maintenance of adequate hydration will improve  Description: Maintenance of adequate hydration will improve  5/24/2021 2038 by Jeana Salas RN  Outcome: Ongoing   IVF infusing.      Problem: Health Behavior:  Goal: Ability to state signs and symptoms to report to health care provider will improve  Description: Ability to state signs and symptoms to report to health care provider will improve  5/24/2021 2038 by Jeana Salas RN  Outcome: Ongoing     Problem: Physical Regulation:  Goal: Complications related to the disease process, condition or treatment will be avoided or minimized  Description: Complications related to the disease process, condition or treatment will be avoided or minimized  5/24/2021 2038 by Jeana Salas RN  Outcome: Ongoing     Problem: Physical Regulation:  Goal: Ability to maintain clinical measurements within normal limits will improve  Description: Ability to maintain clinical measurements within normal limits will improve  5/24/2021 2038 by Oralia Diaz RN  Outcome: Ongoing     Problem: Sensory:  Goal: Ability to identify factors that increase the pain will improve  Description: Ability to identify factors that increase the pain will improve  5/24/2021 2038 by Oralia Diaz RN  Outcome: Ongoing     Problem: Sensory:  Goal: Ability to notify healthcare provider of pain before it becomes unmanageable or unbearable will improve  Description: Ability to notify healthcare provider of pain before it becomes unmanageable or unbearable will improve  5/24/2021 2038 by Oralia Diaz RN  Outcome: Ongoing     Problem: Sensory:  Goal: Pain level will decrease  Description: Pain level will decrease  5/24/2021 2038 by Oralia Diaz RN  Outcome: Ongoing     Problem: Pain:  Description: Pain management should include both nonpharmacologic and pharmacologic interventions. Goal: Pain level will decrease  Description: Pain level will decrease  5/24/2021 2038 by Oralia Diaz RN  Outcome: Ongoing     Problem: Pain:  Description: Pain management should include both nonpharmacologic and pharmacologic interventions. Goal: Control of acute pain  Description: Control of acute pain  5/24/2021 2038 by Oralia Diaz RN  Outcome: Ongoing     Problem: Pain:  Description: Pain management should include both nonpharmacologic and pharmacologic interventions. Goal: Control of chronic pain  Description: Control of chronic pain  5/24/2021 2038 by Oralia Diaz RN  Outcome: Ongoing   PRN pain medication given per order.

## 2021-05-25 NOTE — PROGRESS NOTES
Shift assessment completed, see flowsheets. Medications administered, see MAR. PRN Morphine and Phenergan given per order. Plan of care discussed with patient. Safety precautions in place. Pt denies further needs at this time. Will continue to monitor.   [place] Sanz RN

## 2021-05-25 NOTE — BRIEF OP NOTE
Brief Postoperative Note      Patient: Alexandra Moran  YOB: 1975  MRN: [id number]    Date of Procedure: 5/25/2021    Pre-Op Diagnosis: Abdominal Pain and rectal bleeding. CT showing proctitis       Procedure(s):  SIGMOIDOSCOPY DIAGNOSTIC FLEXIBLE    Surgeon(s):  Prosper Gonzalez MD      Anesthesia: Monitor Anesthesia Care    Estimated Blood Loss (mL): Minimal    Complications: None    Findings:   Colon mucosa was normal. There was large amounts of stool in rectum and colon, c/w fecal impaction. Enlarged internal hemorrhoids. s/p digital disimpaction    Plans  1/2 to 1 gallon golytely for bowel evacuation. As for maintenance bowel regimen, she can have OTC Benefiber or Metamucil. If she is persistently constipated, take OTC miralax 17 grams once to twice daily.  She will likely need a 2 day bowel prep for her future coloscopy with Dr. Nilton Roy     Electronically signed by Prosper Gonzalez MD on 5/25/2021 at 10:52 AM

## 2021-05-25 NOTE — PROGRESS NOTES
Infectious Diseases   Progress Note      Admission Date: 5/24/2021  Hospital Day: Hospital Day: 2   Attending: Jennifer Chan MD  Date of service: 5/25/2021     Chief complaint/ Reason for consult:     · Sepsis with high fever, leukocytosis, tachycardia and tachypnea  · CT abdomen pelvis concerning for proctitis  · Urinary tract infection    Microbiology:        I have reviewed allavailable micro lab data and cultures    · Blood culture (2/2) - collected on 5/24/2021: In process  · Urine culture: Collected on 5/23/2021: Negative so far      Antibiotics and immunizations:       Current antibiotics: All antibiotics and their doses were reviewed by me    Recent Abx Admin                   ciprofloxacin (CIPRO) IVPB 400 mg (mg) 400 mg New Bag 05/25/21 1415     400 mg New Bag 05/24/21 2340    metronidazole (FLAGYL) 500 mg in NaCl 100 mL IVPB premix (mg) 500 mg New Bag 05/25/21 1217     500 mg New Bag  0243     500 mg New Bag 05/24/21 2031                  Immunization History: All immunization history was reviewed by me today. There is no immunization history on file for this patient. Known drug allergies: All allergies were reviewed and updated    Allergies   Allergen Reactions    Codeine Nausea Only       Social history:     Social History:  All social andepidemiologic history was reviewed and updated by me today as needed. · Tobacco use:   reports that she has never smoked. She has never used smokeless tobacco.  · Alcohol use:   reports current alcohol use. · Currently lives in: [facility]  ·  reports no history of drug use. COVID VACCINATION AND LAB RESULT RECORDS:     Internal Administration   First Dose      Second Dose           Last COVID Lab SARS-CoV-2 (no units)   Date Value   05/24/2021 Not Detected            Assessment:     The patient is a 54 y.o. old female who  has no past medical history on file.  with following problems:    · Sepsis with high fever, leukocytosis, tachycardia and tachypnea-has been having fevers, fever, coming down  · CT abdomen pelvis concerning for proctitis-s/p diagnostic  sigmoidoscopy on 5/25/2021  · Urinary tract infection-urine culture in process  · Transaminitis-AST is 41 and ALT is 54 today  · Overweight due to excess calorie intake : Body mass index is 25.79 kg/m². Discussion:      The patient is on empiric IV ciprofloxacin and Flagyl. Her white cell count was 14,400 yesterday      She had a fever up to 103.1 yesterday around 4 PM and 101.4 at 4:45 AM earlier today    Temperature was 100.6 at around 2:15 PM today and then again spiked a high fever of 102.9 later today    Serum creatinine 0.7. AST is 41 and ALT is 54    COVID-19 PCR was negative as expected    Interestingly, blood cultures are negative so far. Urine culture showed mixed ari. She had a flexible sigmoidoscopy done today. Enlarged internal hemorrhoids were noted. Digital fecal disimpaction was done with GI    Complains of pain in the right upper quadrant area    Plan:     Diagnostic Workup:    · Follow-up on blood and urine culture  · Continue to follow  fever curve, WBC count and blood cultures. · Continue to monitor blood counts, liver and renal function. · We will do baseline HIV screen for thoroughness    Antimicrobials:    · Will stop IV ciprofloxacin and Flagyl  · Will escalate antibiotic coverage to IV meropenem 1 g every 8 hours  · We will order oral probiotics twice daily  · We will follow up on the culture results and clinical progress and will make further recommendations accordingly. · Continue close vitals monitoring. · Maintain good glycemic control. · Fall precautions. Aspiration precautions. · Continue to watch for new fever or diarrhea. · DVT prophylaxis. · Discussed all above with patient and RN.   · Discussed with her mother at bedside      Drug Monitoring:    · Continue monitoring for antibiotic toxicity as follows: CBC, CMP, QTc interval  · Continue to watch for following: new or worsening fever, new hypotension, hives, lip swelling and redness or purulence at vascular access sites. I/v access Management:    · Continue to monitor i.v access sites for erythema, induration, discharge or tenderness. · As always, continue efforts to minimize tubes/lines/drains as clinically appropriate to reduce chances of line associated infections. Patient education and counseling:        · The patient was educated in detail about the side-effects of various antibiotics and things to watch for like new rashes, lip swelling, severe reaction, worsening diarrhea, break through fever etc.  · Discussed patient's condition and what to expect. All of the patient's questions were addressed in a satisfactory manner and patient verbalized understanding all instructions. Level of complexity of visit: High     Risk of Complications/Morbidity: High     · Illness(es)/ Infection present that pose threat to life/bodily function. · There is potential for severe exacerbation of infection/side effects of treatment. · Therapy requires intensive monitoring for antimicrobial agent toxicity. Thank you for involving me in the care of your patient. I will continue to follow. If you have anyadditional questions, please do not hesitate to contact me. Subjective: Interval history: Interval history was obtained from chart review and patient/ RN. The patient had a diagnostic sigmoidoscopy done today. She has been having fevers. She is on IV ciprofloxacin and Flagyl     REVIEW OF SYSTEMS:      Review of Systems   Constitutional: Positive for fatigue and fever. Negative for chills, diaphoresis and unexpected weight change. HENT: Negative for congestion, ear discharge, ear pain, facial swelling, hearing loss, rhinorrhea and trouble swallowing. Eyes: Negative for photophobia, discharge, redness and visual disturbance.    Respiratory: Negative for apnea, cough, Left Ear: External ear normal.      Nose: Nose normal.   Eyes:      General: No scleral icterus. Right eye: No discharge. Left eye: No discharge. Conjunctiva/sclera: Conjunctivae normal.      Pupils: Pupils are equal, round, and reactive to light. Cardiovascular:      Rate and Rhythm: Normal rate and regular rhythm. Heart sounds: No murmur heard. No friction rub. Pulmonary:      Effort: Pulmonary effort is normal.      Breath sounds: No stridor. No wheezing or rales. Chest:      Chest wall: No tenderness. Abdominal:      Palpations: Abdomen is soft. There is no mass. Tenderness: There is abdominal tenderness (Right upper quadrant area). There is no guarding or rebound. Musculoskeletal:         General: No tenderness. Cervical back: Normal range of motion and neck supple. Lymphadenopathy:      Cervical: No cervical adenopathy. Skin:     General: Skin is warm and dry. Findings: No erythema or rash. Neurological:      Mental Status: She is alert and oriented to person, place, and time. Motor: No abnormal muscle tone. Psychiatric:         Judgment: Judgment normal.           Lines: All vascular access sites are healthy with no local erythema, discharge or tenderness. Intake and output:    I/O last 3 completed shifts: In: 2903.9 [P.O.:740; I.V.:1941.2; IV Piggyback:222.7]  Out: 350 [Urine:350]    Lab Data:   All available labs and old records have been reviewed by me.     CBC:  Recent Labs     05/24/21  0030 05/25/21 0437   WBC 12.2* 14.4*   RBC 4.60 3.90*   HGB 13.0 11.0*   HCT 39.2 33.8*    128*   MCV 85.1 86.8   MCH 28.3 28.2   MCHC 33.2 32.5   RDW 13.7 14.0        BMP:  Recent Labs     05/24/21  0030 05/25/21  0437    135*   K 4.0 3.5    102   CO2 20* 22   BUN 11 14   CREATININE 0.7 0.7   CALCIUM 9.4 8.3   GLUCOSE 142* 121*        Hepatic Function Panel:   Lab Results   Component Value Date    ALKPHOS 82 05/25/2021    ALT 54 05/25/2021    AST 41 05/25/2021    PROT 6.3 05/25/2021    BILITOT 1.0 05/25/2021    LABALBU 3.4 05/25/2021       CPK: No results found for: CKTOTAL  ESR: No results found for: SEDRATE  CRP: No results found for: CRP        Imaging: All pertinent images and reports for the current visit were reviewed by me during this visit. XR ABDOMEN (KUB) (SINGLE AP VIEW)   Final Result   Unremarkable abdomen. CT ABDOMEN PELVIS W IV CONTRAST Additional Contrast? None   Final Result   1. Wall thickening of the rectum with surrounding stranding. Infection and   inflammation are in the differential.   2. Lobulated enlarged uterus may be due to fibroids. Ovaries are not well   visualized. Ultrasound or MRI would better evaluate. 3. Other findings as described. XR CHEST PORTABLE   Final Result   Negative portable chest.         US NON OB TRANSVAGINAL    (Results Pending)   US GALLBLADDER RUQ    (Results Pending)       Medications: All current and past medications were reviewed.  sodium chloride flush  5-40 mL Intravenous 2 times per day    enoxaparin  40 mg Subcutaneous Daily    pantoprazole  40 mg Oral QAM AC    GI cocktail   Oral Once    metroNIDAZOLE  500 mg Intravenous Q8H    ciprofloxacin  400 mg Intravenous Q12H        sodium chloride 25 mL (05/25/21 1216)    lactated ringers 125 mL/hr at 05/25/21 0441       sodium chloride flush, sodium chloride, promethazine **OR** ondansetron, polyethylene glycol, acetaminophen **OR** acetaminophen, oxyCODONE, perflutren lipid microspheres, morphine, promethazine      Problem list:       Patient Active Problem List   Diagnosis Code    Acute febrile illness R50.9    Septicemia ([facility] [address] A41.9    Abdominal pain R10.9    Proctitis K62.89    Acute cystitis without hematuria N30.00    Abdominal pain, right upper quadrant R10.11    Transaminitis R74.01    Overweight (BMI 25.0-29. 9) E66.3       Please note that this chart was generated using Dragon dictation software. Although every effort was made to ensure the accuracy of this automated transcription, some errors in transcription may have occurred inadvertently. If you may need any clarification, please do not hesitate to contact me through EPIC or at the phone number provided below with my electronic signature. Any pictures or media included in this note were obtained after taking informed verbal consent from the patient and with their approval to include those in the patient's medical record.     Carmen Odom MD, MPH  5/25/2021 , 3:03 PM   [facility] Infectious Disease   [address] [place] [address]  Office: [phone]  Fax: [phone]  Clinic days:  Tuesday & Thursday

## 2021-05-25 NOTE — PROGRESS NOTES
Hospitalist Progress Note      PCP: No primary care provider on file. Date of Admission: 5/24/2021    Chief Complaint: Fever    Hospital Course: 53 yo F with history of recent hemorrhoid banding who came to ER with fevers. [place] [facility] [place]. Admitted as inpatient for fever of unclear origin with sepsis. Started on IV Cipro and Flagyl for proctitis. GI and ID consulted. Echo pending. Underwent colonoscopy on 5/25 Findings:   Colon mucosa was normal. There was large amounts of stool in rectum and colon, c/w fecal impaction. Enlarged internal hemorrhoids. s/p digital disimpaction     Plans  1/2 to 1 gallon golytely for bowel evacuation. As for maintenance bowel regimen, she can have OTC Benefiber or Metamucil. If she is persistently constipated, take OTC miralax 17 grams once to twice daily. She will likely need a 2 day bowel prep for her future coloscopy with Dr. Kristen Kapadia: Patient denies CP, SOB, HA or abdominal pain. Still has fevers. Seen after colonoscopy.       Medications:  Reviewed    Infusion Medications    sodium chloride 25 mL (05/25/21 1216)    lactated ringers 125 mL/hr at 05/25/21 0441     Scheduled Medications    PEG-KCl-NaCl-NaSulf-Na Asc-C   Oral Once    sodium chloride flush  5-40 mL Intravenous 2 times per day    enoxaparin  40 mg Subcutaneous Daily    pantoprazole  40 mg Oral QAM AC    GI cocktail   Oral Once    metroNIDAZOLE  500 mg Intravenous Q8H    ciprofloxacin  400 mg Intravenous Q12H     PRN Meds: sodium chloride flush, sodium chloride, promethazine **OR** ondansetron, polyethylene glycol, acetaminophen **OR** acetaminophen, oxyCODONE, perflutren lipid microspheres, morphine, promethazine      Intake/Output Summary (Last 24 hours) at 5/25/2021 1421  Last data filed at 5/25/2021 1217  Gross per 24 hour   Intake 5284.4 ml   Output 350 ml   Net 4934.4 ml       Physical Exam Performed:    /72   Pulse 125   Temp 100.6 °F (38.1 °C) (Temporal)   Resp 20   Ht 5' 5\" (1.651 m)   Wt 188 lb 12.8 oz (85.6 kg)   SpO2 96%   BMI 31.42 kg/m²     General appearance: No apparent distress, appears stated age and cooperative. HEENT: Pupils equal, round, and reactive to light. Conjunctivae/corneas clear. Neck: Supple, with full range of motion. No jugular venous distention. Trachea midline. Respiratory:  Normal respiratory effort. Clear to auscultation, bilaterally without Rales/Wheezes/Rhonchi. Cardiovascular: Regular rate and rhythm with normal S1/S2 without murmurs, rubs or gallops. Abdomen: Soft, non-tender, non-distended with normal bowel sounds. Musculoskeletal: No clubbing, cyanosis or edema bilaterally. Full range of motion without deformity. Skin: Skin color, texture, turgor normal.  No rashes or lesions. Neurologic:  Neurovascularly intact without any focal sensory/motor deficits. Cranial nerves: II-XII intact, grossly non-focal.  Psychiatric: Alert and oriented, thought content appropriate, normal insight  Capillary Refill: Brisk,3 seconds, normal   Peripheral Pulses: +2 palpable, equal bilaterally       Labs:   Recent Labs     05/24/21  0030 05/25/21 0437   WBC 12.2* 14.4*   HGB 13.0 11.0*   HCT 39.2 33.8*    128*     Recent Labs     05/24/21  0030 05/25/21 0437    135*   K 4.0 3.5    102   CO2 20* 22   BUN 11 14   CREATININE 0.7 0.7   CALCIUM 9.4 8.3   PHOS  --  1.9*     Recent Labs     05/24/21  0030 05/25/21 0437   AST 58* 41*   ALT 56* 54*   BILITOT 0.8 1.0   ALKPHOS 72 82     No results for input(s): INR in the last 72 hours. No results for input(s): [place] in the last 72 hours.     Urinalysis:      Lab Results   Component Value Date    NITRU Negative 05/23/2021    WBCUA 11 05/23/2021    BACTERIA 2+ 05/23/2021    RBCUA 0-2 05/23/2021    BLOODU Negative 05/23/2021    SPECGRAV 1.016 05/23/2021    GLUCOSEU Negative 05/23/2021       Radiology:  XR ABDOMEN (KUB) (SINGLE AP VIEW)   Final Result Unremarkable abdomen. CT ABDOMEN PELVIS W IV CONTRAST Additional Contrast? None   Final Result   1. Wall thickening of the rectum with surrounding stranding. Infection and   inflammation are in the differential.   2. Lobulated enlarged uterus may be due to fibroids. Ovaries are not well   visualized. Ultrasound or MRI would better evaluate. 3. Other findings as described. XR CHEST PORTABLE   Final Result   Negative portable chest.         US NON OB TRANSVAGINAL    (Results Pending)   US GALLBLADDER RUQ    (Results Pending)           Assessment/Plan:    Active Hospital Problems    Diagnosis     Acute febrile illness [R50.9]     Septicemia ([address] [A41.9]     Abdominal pain [R10.9]     Proctitis [K62.89]     Acute cystitis without hematuria [N30.00]     Abdominal pain, right upper quadrant [R10.11]     Transaminitis [R74.01]     Overweight (BMI 25.0-29. 9) [E66.3]      1. Cont Cipro and Flagyl for suspected proctitis  2. GoLytely  3. Await Echo for fevers  4. ID consult for fevers appreciated  5. GI consult for proctitis appreciated  6. Gyn consult for fibroid uterus appreciated    DVT Prophylaxis: Lovenox  Diet: Diet NPO, After Midnight Exceptions are: Sips of Water with Meds  Code Status: Full Code    PT/OT Eval Status: Not needed    Dispo - Home    Discussed with patient, nursing and CM. D/W mother at bedside. Still with fevers. Not ready for home.     Delmis Palomo MD

## 2021-05-25 NOTE — PLAN OF CARE
Problem: Activity:  Goal: Risk for activity intolerance will decrease  Description: Risk for activity intolerance will decrease  Outcome: Ongoing     Problem:  Bowel/Gastric:  Goal: Bowel function will improve  Description: Bowel function will improve  Outcome: Ongoing  Goal: Diagnostic test results will improve  Description: Diagnostic test results will improve  Outcome: Ongoing  Goal: Occurrences of nausea will decrease  Description: Occurrences of nausea will decrease  Outcome: Ongoing  Goal: Occurrences of vomiting will decrease  Description: Occurrences of vomiting will decrease  Outcome: Ongoing     Problem: Fluid Volume:  Goal: Maintenance of adequate hydration will improve  Description: Maintenance of adequate hydration will improve  Outcome: Ongoing     Problem: Health Behavior:  Goal: Ability to state signs and symptoms to report to health care provider will improve  Description: Ability to state signs and symptoms to report to health care provider will improve  Outcome: Ongoing     Problem: Physical Regulation:  Goal: Complications related to the disease process, condition or treatment will be avoided or minimized  Description: Complications related to the disease process, condition or treatment will be avoided or minimized  Outcome: Ongoing  Goal: Ability to maintain clinical measurements within normal limits will improve  Description: Ability to maintain clinical measurements within normal limits will improve  Outcome: Ongoing     Problem: Sensory:  Goal: Ability to identify factors that increase the pain will improve  Description: Ability to identify factors that increase the pain will improve  Outcome: Ongoing  Goal: Ability to notify healthcare provider of pain before it becomes unmanageable or unbearable will improve  Description: Ability to notify healthcare provider of pain before it becomes unmanageable or unbearable will improve  Outcome: Ongoing  Goal: Pain level will decrease  Description: Pain level will decrease  Outcome: Ongoing     Problem: Pain:  Goal: Pain level will decrease  Description: Pain level will decrease  Outcome: Ongoing  Goal: Control of acute pain  Description: Control of acute pain  Outcome: Ongoing  Goal: Control of chronic pain  Description: Control of chronic pain  Outcome: Ongoing     Problem: Falls - Risk of:  Goal: Will remain free from falls  Description: Will remain free from falls  Outcome: Ongoing  Goal: Absence of physical injury  Description: Absence of physical injury  Outcome: Ongoing

## 2021-05-25 NOTE — PROGRESS NOTES
Assessment completed, pt running low grade fever, tachycardic, tachypenic, pain 8/10. Patient given morphine, zofran, and phenergan pr MAR. 3L O2 given via nasal cannula. Patient now resting comfortably, and all questions asked and answered.

## 2021-05-25 NOTE — ANESTHESIA PRE PROCEDURE
Department of Anesthesiology  Preprocedure Note       Name:  Naveen Fong   Age:  54 y.o.  :  1975                                          MRN:  [id number]         Date:  2021      Surgeon: Talya Moreno):  Sylvia Ambrosio MD    Procedure: Procedure(s):  SIGMOIDOSCOPY BIOPSY FLEXIBLE    Medications prior to admission:   Prior to Admission medications    Medication Sig Start Date End Date Taking? Authorizing Provider   clonazePAM (KLONOPIN) 0.125 MG disintegrating tablet Take 0.125 mg by mouth nightly as needed.    Yes Historical Provider, MD   ibuprofen (ADVIL;MOTRIN) 100 MG tablet Take 1-2 tablets by mouth daily as needed   Yes Historical Provider, MD       Current medications:    Current Facility-Administered Medications   Medication Dose Route Frequency Provider Last Rate Last Admin    sodium chloride flush 0.9 % injection 5-40 mL  5-40 mL Intravenous 2 times per day Johnathan Benitez MD   10 mL at 21 2140    sodium chloride flush 0.9 % injection 10 mL  10 mL Intravenous PRN Johnathan Benitez MD   10 mL at 21 0044    0.9 % sodium chloride infusion  25 mL Intravenous PRN Johnathan Benitez MD   Stopped at 21 0234    enoxaparin (LOVENOX) injection 40 mg  40 mg Subcutaneous Daily Gokul De Oliveira MD   40 mg at 21 1024    promethazine (PHENERGAN) tablet 12.5 mg  12.5 mg Oral Q6H PRN Johnathan Benitez MD   12.5 mg at 21 0926    Or    ondansetron (ZOFRAN) injection 4 mg  4 mg Intravenous Q6H PRN Johnathan Benitez MD   4 mg at 21 0043    polyethylene glycol (GLYCOLAX) packet 17 g  17 g Oral Daily PRN Johnathan Benitez MD        acetaminophen (TYLENOL) tablet 650 mg  650 mg Oral Q6H PRN Johnathan Benitez MD   650 mg at 21 4271    Or    acetaminophen (TYLENOL) suppository 650 mg  650 mg Rectal Q6H PRN Johnathan Benitez MD        oxyCODONE (ROXICODONE) immediate release tablet 5 mg  5 mg Oral Q6H PRN Johnathan Benitez MD        lactated ringers infusion CAD, CABG/stent, dysrhythmias,  angina and  CHF      Rhythm: regular  Rate: normal                    Neuro/Psych:   Negative Neuro/Psych ROS              GI/Hepatic/Renal:             Endo/Other: Negative Endo/Other ROS                    Abdominal:           Vascular:                                      Anesthesia Plan      MAC     ASA 2       Induction: intravenous. Anesthetic plan and risks discussed with patient. Plan discussed with CRNA.     Attending anesthesiologist reviewed and agrees with Pre Eval content    pending ucg        Adal Mendoza MD   5/25/2021

## 2021-05-25 NOTE — ANESTHESIA POSTPROCEDURE EVALUATION
Department of Anesthesiology  Postprocedure Note    Patient: Viki Naqvi  MRN: [id number]  YOB: 1975  Date of evaluation: 5/25/2021  Time:  10:48 AM     Procedure Summary     Date: 05/25/21 Room / Location: [address] [place] [address]    Anesthesia Start: 1020 Anesthesia Stop: 8793    Procedure: SIGMOIDOSCOPY DIAGNOSTIC FLEXIBLE (N/A ) Diagnosis: (Abdominal Pain)    Surgeons: Lalo Fernández MD Responsible Provider: Artem Earl MD    Anesthesia Type: MAC ASA Status: 2          Anesthesia Type: MAC    Foster Phase I: Foster Score: 9    Foster Phase II:      Last vitals: Reviewed and per EMR flowsheets.        Anesthesia Post Evaluation    Patient location during evaluation: PACU  Patient participation: complete - patient participated  Level of consciousness: awake and alert  Pain score: 0  Airway patency: patent  Nausea & Vomiting: no nausea and no vomiting  Complications: no  Cardiovascular status: blood pressure returned to baseline and hemodynamically stable  Respiratory status: acceptable  Hydration status: stable

## 2021-05-26 ENCOUNTER — APPOINTMENT (OUTPATIENT)
Dept: CT IMAGING | Age: 46
DRG: 871 | End: 2021-05-26
Payer: COMMERCIAL

## 2021-05-26 LAB
A/G RATIO: 1.3 (ref 1.1–2.2)
ALBUMIN SERPL-MCNC: 3.5 G/DL (ref 3.4–5)
ALP BLD-CCNC: 89 U/L (ref 40–129)
ALT SERPL-CCNC: 51 U/L (ref 10–40)
ANION GAP SERPL CALCULATED.3IONS-SCNC: 10 MMOL/L (ref 3–16)
AST SERPL-CCNC: 37 U/L (ref 15–37)
BASOPHILS ABSOLUTE: 0 K/UL (ref 0–0.2)
BASOPHILS RELATIVE PERCENT: 0.1 %
BILIRUB SERPL-MCNC: 0.9 MG/DL (ref 0–1)
BUN BLDV-MCNC: 11 MG/DL (ref 7–20)
CALCIUM SERPL-MCNC: 8.6 MG/DL (ref 8.3–10.6)
CHLORIDE BLD-SCNC: 101 MMOL/L (ref 99–110)
CO2: 24 MMOL/L (ref 21–32)
CREAT SERPL-MCNC: 0.7 MG/DL (ref 0.6–1.1)
EOSINOPHILS ABSOLUTE: 0.2 K/UL (ref 0–0.6)
EOSINOPHILS RELATIVE PERCENT: 1.9 %
GFR AFRICAN AMERICAN: >60
GFR NON-AFRICAN AMERICAN: >60
GLOBULIN: 2.8 G/DL
GLUCOSE BLD-MCNC: 115 MG/DL (ref 70–99)
GLUCOSE BLD-MCNC: 125 MG/DL (ref 70–99)
HCT VFR BLD CALC: 32.9 % (ref 36–48)
HEMOGLOBIN: 11 G/DL (ref 12–16)
LYMPHOCYTES ABSOLUTE: 0.4 K/UL (ref 1–5.1)
LYMPHOCYTES RELATIVE PERCENT: 3.9 %
MAGNESIUM: 1.9 MG/DL (ref 1.8–2.4)
MCH RBC QN AUTO: 28.5 PG (ref 26–34)
MCHC RBC AUTO-ENTMCNC: 33.5 G/DL (ref 31–36)
MCV RBC AUTO: 84.9 FL (ref 80–100)
MONOCYTES ABSOLUTE: 0.7 K/UL (ref 0–1.3)
MONOCYTES RELATIVE PERCENT: 6.7 %
NEUTROPHILS ABSOLUTE: 9.5 K/UL (ref 1.7–7.7)
NEUTROPHILS RELATIVE PERCENT: 87.4 %
PDW BLD-RTO: 14.3 % (ref 12.4–15.4)
PERFORMED ON: ABNORMAL
PHOSPHORUS: 1.3 MG/DL (ref 2.5–4.9)
PLATELET # BLD: 135 K/UL (ref 135–450)
PMV BLD AUTO: 8.9 FL (ref 5–10.5)
POTASSIUM SERPL-SCNC: 3.5 MMOL/L (ref 3.5–5.1)
RBC # BLD: 3.87 M/UL (ref 4–5.2)
SODIUM BLD-SCNC: 135 MMOL/L (ref 136–145)
TOTAL PROTEIN: 6.3 G/DL (ref 6.4–8.2)
WBC # BLD: 10.9 K/UL (ref 4–11)

## 2021-05-26 PROCEDURE — 6360000002 HC RX W HCPCS: Performed by: INTERNAL MEDICINE

## 2021-05-26 PROCEDURE — C1751 CATH, INF, PER/CENT/MIDLINE: HCPCS

## 2021-05-26 PROCEDURE — 83735 ASSAY OF MAGNESIUM: CPT

## 2021-05-26 PROCEDURE — 2580000003 HC RX 258: Performed by: INTERNAL MEDICINE

## 2021-05-26 PROCEDURE — 80053 COMPREHEN METABOLIC PANEL: CPT

## 2021-05-26 PROCEDURE — 6370000000 HC RX 637 (ALT 250 FOR IP): Performed by: INTERNAL MEDICINE

## 2021-05-26 PROCEDURE — 71260 CT THORAX DX C+: CPT

## 2021-05-26 PROCEDURE — 94761 N-INVAS EAR/PLS OXIMETRY MLT: CPT

## 2021-05-26 PROCEDURE — 2700000000 HC OXYGEN THERAPY PER DAY

## 2021-05-26 PROCEDURE — 1200000000 HC SEMI PRIVATE

## 2021-05-26 PROCEDURE — 87390 HIV-1 AG IA: CPT

## 2021-05-26 PROCEDURE — 86701 HIV-1ANTIBODY: CPT

## 2021-05-26 PROCEDURE — 6370000000 HC RX 637 (ALT 250 FOR IP): Performed by: NURSE PRACTITIONER

## 2021-05-26 PROCEDURE — 85025 COMPLETE CBC W/AUTO DIFF WBC: CPT

## 2021-05-26 PROCEDURE — 99233 SBSQ HOSP IP/OBS HIGH 50: CPT | Performed by: INTERNAL MEDICINE

## 2021-05-26 PROCEDURE — 02HV33Z INSERTION OF INFUSION DEVICE INTO SUPERIOR VENA CAVA, PERCUTANEOUS APPROACH: ICD-10-PCS | Performed by: NURSE PRACTITIONER

## 2021-05-26 PROCEDURE — 6360000004 HC RX CONTRAST MEDICATION: Performed by: INTERNAL MEDICINE

## 2021-05-26 PROCEDURE — 36569 INSJ PICC 5 YR+ W/O IMAGING: CPT

## 2021-05-26 PROCEDURE — 36415 COLL VENOUS BLD VENIPUNCTURE: CPT

## 2021-05-26 PROCEDURE — 87040 BLOOD CULTURE FOR BACTERIA: CPT

## 2021-05-26 PROCEDURE — 86702 HIV-2 ANTIBODY: CPT

## 2021-05-26 PROCEDURE — 84100 ASSAY OF PHOSPHORUS: CPT

## 2021-05-26 RX ORDER — LORAZEPAM 2 MG/ML
0.5 INJECTION INTRAMUSCULAR EVERY 4 HOURS PRN
Status: DISCONTINUED | OUTPATIENT
Start: 2021-05-26 | End: 2021-05-29 | Stop reason: HOSPADM

## 2021-05-26 RX ORDER — ONDANSETRON 4 MG/1
4 TABLET, ORALLY DISINTEGRATING ORAL EVERY 8 HOURS PRN
Status: DISCONTINUED | OUTPATIENT
Start: 2021-05-26 | End: 2021-05-29 | Stop reason: HOSPADM

## 2021-05-26 RX ORDER — LIDOCAINE HYDROCHLORIDE 10 MG/ML
5 INJECTION, SOLUTION EPIDURAL; INFILTRATION; INTRACAUDAL; PERINEURAL ONCE
Status: DISCONTINUED | OUTPATIENT
Start: 2021-05-26 | End: 2021-05-29 | Stop reason: HOSPADM

## 2021-05-26 RX ORDER — SODIUM PHOSPHATE, DIBASIC AND SODIUM PHOSPHATE, MONOBASIC 7; 19 G/133ML; G/133ML
1 ENEMA RECTAL
Status: ACTIVE | OUTPATIENT
Start: 2021-05-26 | End: 2021-05-26

## 2021-05-26 RX ORDER — KETOROLAC TROMETHAMINE 30 MG/ML
30 INJECTION, SOLUTION INTRAMUSCULAR; INTRAVENOUS EVERY 6 HOURS
Status: COMPLETED | OUTPATIENT
Start: 2021-05-26 | End: 2021-05-28

## 2021-05-26 RX ORDER — SODIUM CHLORIDE 9 MG/ML
25 INJECTION, SOLUTION INTRAVENOUS PRN
Status: DISCONTINUED | OUTPATIENT
Start: 2021-05-26 | End: 2021-05-29 | Stop reason: HOSPADM

## 2021-05-26 RX ORDER — SODIUM CHLORIDE 0.9 % (FLUSH) 0.9 %
5-40 SYRINGE (ML) INJECTION EVERY 12 HOURS SCHEDULED
Status: DISCONTINUED | OUTPATIENT
Start: 2021-05-26 | End: 2021-05-29 | Stop reason: HOSPADM

## 2021-05-26 RX ORDER — POLYETHYLENE GLYCOL 3350 17 G/17G
17 POWDER, FOR SOLUTION ORAL 2 TIMES DAILY
Status: DISCONTINUED | OUTPATIENT
Start: 2021-05-26 | End: 2021-05-29 | Stop reason: HOSPADM

## 2021-05-26 RX ORDER — SODIUM CHLORIDE 0.9 % (FLUSH) 0.9 %
5-40 SYRINGE (ML) INJECTION PRN
Status: DISCONTINUED | OUTPATIENT
Start: 2021-05-26 | End: 2021-05-29 | Stop reason: HOSPADM

## 2021-05-26 RX ADMIN — LORAZEPAM 0.5 MG: 2 INJECTION INTRAMUSCULAR; INTRAVENOUS at 22:25

## 2021-05-26 RX ADMIN — ONDANSETRON 4 MG: 4 TABLET, ORALLY DISINTEGRATING ORAL at 13:45

## 2021-05-26 RX ADMIN — MEROPENEM 1000 MG: 1 INJECTION, POWDER, FOR SOLUTION INTRAVENOUS at 15:10

## 2021-05-26 RX ADMIN — MEROPENEM 1000 MG: 1 INJECTION, POWDER, FOR SOLUTION INTRAVENOUS at 02:22

## 2021-05-26 RX ADMIN — POLYETHYLENE GLYCOL 3350 17 G: 17 POWDER, FOR SOLUTION ORAL at 13:46

## 2021-05-26 RX ADMIN — KETOROLAC TROMETHAMINE 30 MG: 30 INJECTION, SOLUTION INTRAMUSCULAR; INTRAVENOUS at 21:19

## 2021-05-26 RX ADMIN — Medication 10 ML: at 05:09

## 2021-05-26 RX ADMIN — MORPHINE SULFATE 4 MG: 4 INJECTION, SOLUTION INTRAMUSCULAR; INTRAVENOUS at 05:08

## 2021-05-26 RX ADMIN — SODIUM CHLORIDE 25 ML: 9 INJECTION, SOLUTION INTRAVENOUS at 09:38

## 2021-05-26 RX ADMIN — IOPAMIDOL 75 ML: 755 INJECTION, SOLUTION INTRAVENOUS at 17:50

## 2021-05-26 RX ADMIN — Medication 10 ML: at 21:19

## 2021-05-26 RX ADMIN — ONDANSETRON 4 MG: 2 INJECTION INTRAMUSCULAR; INTRAVENOUS at 21:18

## 2021-05-26 RX ADMIN — SODIUM CHLORIDE 25 ML: 9 INJECTION, SOLUTION INTRAVENOUS at 15:09

## 2021-05-26 RX ADMIN — ONDANSETRON 4 MG: 2 INJECTION INTRAMUSCULAR; INTRAVENOUS at 05:08

## 2021-05-26 RX ADMIN — MEROPENEM 1000 MG: 1 INJECTION, POWDER, FOR SOLUTION INTRAVENOUS at 09:40

## 2021-05-26 RX ADMIN — PANTOPRAZOLE SODIUM 40 MG: 40 TABLET, DELAYED RELEASE ORAL at 05:09

## 2021-05-26 RX ADMIN — ACETAMINOPHEN 650 MG: 325 TABLET ORAL at 11:09

## 2021-05-26 RX ADMIN — SODIUM CHLORIDE 25 ML: 9 INJECTION, SOLUTION INTRAVENOUS at 02:21

## 2021-05-26 RX ADMIN — POLYETHYLENE GLYCOL 3350 17 G: 17 POWDER, FOR SOLUTION ORAL at 21:17

## 2021-05-26 RX ADMIN — KETOROLAC TROMETHAMINE 30 MG: 30 INJECTION, SOLUTION INTRAMUSCULAR; INTRAVENOUS at 15:10

## 2021-05-26 RX ADMIN — ENOXAPARIN SODIUM 40 MG: 40 INJECTION SUBCUTANEOUS at 10:00

## 2021-05-26 ASSESSMENT — ENCOUNTER SYMPTOMS
PHOTOPHOBIA: 0
APNEA: 0
BLOOD IN STOOL: 0
SHORTNESS OF BREATH: 0
CHEST TIGHTNESS: 0
FACIAL SWELLING: 0
EYE REDNESS: 0
ABDOMINAL PAIN: 0
NAUSEA: 0
EYE DISCHARGE: 0
COUGH: 0
RHINORRHEA: 0
TROUBLE SWALLOWING: 0
CHOKING: 0
STRIDOR: 0
DIARRHEA: 0
COLOR CHANGE: 0

## 2021-05-26 ASSESSMENT — PAIN DESCRIPTION - FREQUENCY: FREQUENCY: INTERMITTENT

## 2021-05-26 ASSESSMENT — PAIN SCALES - GENERAL
PAINLEVEL_OUTOF10: 5
PAINLEVEL_OUTOF10: 0
PAINLEVEL_OUTOF10: 6
PAINLEVEL_OUTOF10: 5
PAINLEVEL_OUTOF10: 5
PAINLEVEL_OUTOF10: 7
PAINLEVEL_OUTOF10: 3

## 2021-05-26 ASSESSMENT — PAIN DESCRIPTION - LOCATION: LOCATION: HEAD

## 2021-05-26 ASSESSMENT — PAIN DESCRIPTION - DESCRIPTORS: DESCRIPTORS: HEADACHE

## 2021-05-26 ASSESSMENT — PAIN DESCRIPTION - PAIN TYPE: TYPE: ACUTE PAIN

## 2021-05-26 ASSESSMENT — PAIN DESCRIPTION - ONSET: ONSET: ON-GOING

## 2021-05-26 NOTE — PROGRESS NOTES
Mom spoke to this RN, concerned about patient condition and why she is not getting better and what is causing her to feel this way. Patient has increased need for oxygen, desatted to the 70's on room air while ambulating to the bathroom. Patient placed on 4 L of oxygen.

## 2021-05-26 NOTE — PROGRESS NOTES
Gastroenterology Progress Note    Eitan Santana is a 54 y.o. female patient. Principal Problem:    Proctitis  Active Problems:    Acute febrile illness    Septicemia ([address]    Abdominal pain    Acute cystitis without hematuria    Abdominal pain, right upper quadrant    Transaminitis    Overweight (BMI 25.0-29. 9)  Resolved Problems:    * No resolved hospital problems. *      SUBJECTIVE:  Still not feeling good, very nauseated. Lost IV access waiting for PICC placement. Planning for CTA today. Having BM from [employer]. ROS:  No fever, chills  No chest pain, palpitations  No SOB, cough  Gastrointestinal ROS: see above    Physical    VITALS:  /73   Pulse 90   Temp 99.1 °F (37.3 °C) (Oral)   Resp 18   Ht 5' 5\" (1.651 m)   Wt 186 lb 9.6 oz (84.6 kg)   SpO2 96%   BMI 31.05 kg/m²   TEMPERATURE:  Current - Temp: 99.1 °F (37.3 °C); Max - Temp  Av.6 °F (37.6 °C)  Min: 97.6 °F (36.4 °C)  Max: 102.9 °F (39.4 °C)    NAD  Regular rate   Lungs CTA Bilaterally  Abdomen soft, ND, NT,  Bowel sounds normal.    Data    Data Review:    Recent Labs     210 21  0611   WBC 12.2* 14.4* 10.9   HGB 13.0 11.0* 11.0*   HCT 39.2 33.8* 32.9*   MCV 85.1 86.8 84.9    128* 135     Recent Labs     210 21  0611    135* 135*   K 4.0 3.5 3.5    102 101   CO2 20* 22 24   PHOS  --  1.9* 1.3*   BUN 11 14 11   CREATININE 0.7 0.7 0.7     Recent Labs     210 21  0611   AST 58* 41* 37   ALT 56* 54* 51*   BILITOT 0.8 1.0 0.9   ALKPHOS 72 82 89     Recent Labs     21  003   LIPASE 37.0     No results for input(s): PROTIME, INR in the last 72 hours. No results for input(s): PTT in the last 72 hours. Flex Sig  with Dr. Evelyn Resendiz  Findings:   Colon mucosa was normal. There was large amounts of stool in rectum and colon, c/w fecal impaction. Enlarged internal hemorrhoids.  s/p digital disimpaction      ASSESSMENT :  Abdominal pain, nausea, vomiting -for 3 days. CT with wall thickening of the rectum with stranding. ?  If this could be related to recent hemorrhoidal banding or represent proctitis. Noted to have mild elevation of liver enzymes. She is status post cholecystectomy. Negative for hepatitis A, B, C. Flex sig showed fecal impaction, now having BM following disimpaction and moviprep.    Fever -ID consulted    PLAN :  - Monitor Liver enzymes  - OTC benefiber or metamucil  - OTC miralax as needed  - Abx per ID    Discussed with Dr. Franne Alpers, [address] [place] [address]

## 2021-05-26 NOTE — PROGRESS NOTES
Hospitalist Progress Note      PCP: No primary care provider on file. Date of Admission: 5/24/2021    Chief Complaint: Fever    Hospital Course: 53 yo F with history of recent hemorrhoid banding who came to ER with fevers. Had gone to [facility] ED and sent home. Admitted as inpatient for fever of unclear origin with sepsis. Started on IV Cipro and Flagyl for proctitis. GI and ID consulted. Echo pending. Underwent colonoscopy on 5/25 Findings:   Colon mucosa was normal. There was large amounts of stool in rectum and colon, c/w fecal impaction. Enlarged internal hemorrhoids. s/p digital disimpaction     Plans  1/2 to 1 gallon golytely for bowel evacuation. As for maintenance bowel regimen, she can have OTC Benefiber or Metamucil. If she is persistently constipated, take OTC miralax 17 grams once to twice daily. She will likely need a 2 day bowel prep for her future coloscopy with Dr. Shreyas Novak: Patient denies CP, SOB, HA or abdominal pain. No fevers, had low grade temps. Complaining of nausea and headaches. No large BM since colonoscopy. States she is coughing now.       Medications:  Reviewed    Infusion Medications    sodium chloride      sodium chloride 25 mL (05/26/21 0904)     Scheduled Medications    lidocaine 1 % injection  5 mL Intradermal Once    sodium chloride flush  5-40 mL Intravenous 2 times per day    meropenem  1,000 mg Intravenous Q8H    sodium chloride flush  5-40 mL Intravenous 2 times per day    enoxaparin  40 mg Subcutaneous Daily    pantoprazole  40 mg Oral QAM AC    GI cocktail   Oral Once     PRN Meds: sodium chloride flush, sodium chloride, sodium chloride flush, sodium chloride, promethazine **OR** ondansetron, polyethylene glycol, acetaminophen **OR** acetaminophen, perflutren lipid microspheres, morphine, promethazine      Intake/Output Summary (Last 24 hours) at 5/26/2021 1211  Last data filed at 5/26/2021 0900  Gross per 24 hour   Intake 1876.98 ml Output 900 ml   Net 976.98 ml       Physical Exam Performed:    /74   Pulse 101   Temp 97.6 °F (36.4 °C) (Temporal)   Resp 18   Ht 5' 5\" (1.651 m)   Wt 186 lb 9.6 oz (84.6 kg)   SpO2 92%   BMI 31.05 kg/m²     General appearance: No apparent distress, appears stated age and cooperative. HEENT: Pupils equal, round, and reactive to light. Conjunctivae/corneas clear. Neck: Supple, with full range of motion. No jugular venous distention. Trachea midline. Respiratory:  Normal respiratory effort. Clear to auscultation, bilaterally without Rales/Wheezes/Rhonchi. Cardiovascular: Regular rate and rhythm with normal S1/S2 without murmurs, rubs or gallops. Abdomen: Soft, non-tender, non-distended with normal bowel sounds. Musculoskeletal: No clubbing, cyanosis or edema bilaterally. Full range of motion without deformity. Skin: Skin color, texture, turgor normal.  No rashes or lesions. Neurologic:  Neurovascularly intact without any focal sensory/motor deficits. Cranial nerves: II-XII intact, grossly non-focal.  Psychiatric: Alert and oriented, thought content appropriate, normal insight  Capillary Refill: Brisk,3 seconds, normal   Peripheral Pulses: +2 palpable, equal bilaterally       Labs:   Recent Labs     05/24/21 0030 05/25/21 0437 05/26/21  0611   WBC 12.2* 14.4* 10.9   HGB 13.0 11.0* 11.0*   HCT 39.2 33.8* 32.9*    128* 135     Recent Labs     05/24/21  0030 05/25/21 0437 05/26/21  0611    135* 135*   K 4.0 3.5 3.5    102 101   CO2 20* 22 24   BUN 11 14 11   CREATININE 0.7 0.7 0.7   CALCIUM 9.4 8.3 8.6   PHOS  --  1.9* 1.3*     Recent Labs     05/24/21  0030 05/25/21 0437 05/26/21  0611   AST 58* 41* 37   ALT 56* 54* 51*   BILITOT 0.8 1.0 0.9   ALKPHOS 72 82 89     No results for input(s): INR in the last 72 hours. No results for input(s): Carola Perrin in the last 72 hours.     Urinalysis:      Lab Results   Component Value Date    NITRU Negative 05/23/2021    WBCUA 11 X 8 doses  11. Miralax bid  12. Fleet enema X 1  13. Midline for access  14. Ativan IV PRN anxiety/insomnia     DVT Prophylaxis: Lovenox  Diet: DIET FULL LIQUID;  Code Status: Full Code    PT/OT Eval Status: Not needed    Dispo - Home    Discussed with patient, nursing and CM. D/W mother at bedside. Patient and mother updated in detail about current findings. I suspect she has struggled with impaction since hemorrhoid surgery and that is primary problem here.     Martita Reed MD

## 2021-05-26 NOTE — PROGRESS NOTES
Pt transferred from [facility] to room [employer]. Pt oriented to room and surroundings.

## 2021-05-26 NOTE — ACP (ADVANCE CARE PLANNING)
Advanced Care Planning Note. Purpose of Encounter: Advanced care planning in light of proctitis  Parties In Attendance: Patient, mother  Decisional Capacity: Yes  Subjective: Patient with cough  Objective: Cr 0.7  Goals of Care Determination: Patient wants full support (CPR, vent, surgery, HD, trach, PEG)  Plan:  IV Abx, CTA Chest, Pulm consult, bowel regimen, ID/GI/Gyn consults  Code Status: Full code   Time spent on Advanced care Plannin minutes  Advanced Care Planning Documents: Completed advanced directives on chart, mother is the POA.     Ramesh Campoverde MD  2021 12:16 PM

## 2021-05-26 NOTE — PROGRESS NOTES
Shift assessment completed, see flowsheets. Medications administered, see MAR. Safety precautions in place. Bed locked and in low position. Call light and bedside table within reach. Plan of care discussed with patient and  family. Pt denies further needs at this time. Will continue to monitor.   Namrata Pugh RN

## 2021-05-26 NOTE — PROGRESS NOTES
Infectious Diseases   Progress Note      Admission Date: 5/24/2021  Hospital Day: Hospital Day: 3   Attending: Yunior Lazo MD  Date of service: 5/26/2021     Chief complaint/ Reason for consult:     · Sepsis with high fever, leukocytosis, tachycardia and tachypnea  · CT abdomen pelvis concerning for proctitis  · Urinary tract infection    Microbiology:        I have reviewed allavailable micro lab data and cultures    · Blood culture (2/2) - collected on 5/24/2021: In process  · Urine culture: Collected on 5/23/2021: Negative so far      Antibiotics and immunizations:       Current antibiotics: All antibiotics and their doses were reviewed by me    Recent Abx Admin                   meropenem (MERREM) 1,000 mg in sodium chloride 0.9 % 100 mL IVPB (mini-bag) (mg) 1,000 mg New Bag 05/26/21 0940     1,000 mg New Bag  0222     1,000 mg New Bag 05/25/21 1852    ciprofloxacin (CIPRO) IVPB 400 mg (mg) 400 mg New Bag 05/25/21 1415                  Immunization History: All immunization history was reviewed by me today. There is no immunization history on file for this patient. Known drug allergies: All allergies were reviewed and updated    Allergies   Allergen Reactions    Codeine Nausea Only       Social history:     Social History:  All social andepidemiologic history was reviewed and updated by me today as needed. · Tobacco use:   reports that she has never smoked. She has never used smokeless tobacco.  · Alcohol use:   reports current alcohol use. · Currently lives in: [facility]  ·  reports no history of drug use. COVID VACCINATION AND LAB RESULT RECORDS:     Internal Administration   First Dose      Second Dose           Last COVID Lab SARS-CoV-2 (no units)   Date Value   05/24/2021 Not Detected            Assessment:     The patient is a 54 y.o. old female who  has no past medical history on file.  with following problems:    · Sepsis with high fever, leukocytosis, tachycardia and vitals monitoring. · Maintain good glycemic control. · Fall precautions. Aspiration precautions. · Continue to watch for new fever or diarrhea. · DVT prophylaxis. · Discussed all above with patient and RN. Drug Monitoring:    · Continue monitoring for antibiotic toxicity as follows: CBC, CMP   · Continue to watch for following: new or worsening fever, new hypotension, hives, lip swelling and redness or purulence at vascular access sites. I/v access Management:    · Continue to monitor i.v access sites for erythema, induration, discharge or tenderness. · As always, continue efforts to minimize tubes/lines/drains as clinically appropriate to reduce chances of line associated infections. Patient education and counseling:        · The patient was educated in detail about the side-effects of various antibiotics and things to watch for like new rashes, lip swelling, severe reaction, worsening diarrhea, break through fever etc.  · Discussed patient's condition and what to expect. All of the patient's questions were addressed in a satisfactory manner and patient verbalized understanding all instructions. Level of complexity of visit: High     Risk of Complications/Morbidity: High     · Illness(es)/ Infection present that pose threat to life/bodily function. · There is potential for severe exacerbation of infection/side effects of treatment. · Therapy requires intensive monitoring for antimicrobial agent toxicity. TIME SPENT TODAY:     - Spent over  36  minutes on visit (including interval history, physical exam, review of data including labs, cultures, imaging, development and implementation of treatment plan and coordination of complex care). More than 50 percent of this includes face-to-face time spent with the patient for counseling and coordination of care. Thank you for involving me in the care of your patient. I will continue to follow.  If you have anyadditional questions, please do not hesitate to contact me. Subjective: Interval history: Interval history was obtained from chart review and patient/ RN. He is finally afebrile today. She is on IV meropenem. She seems to be tolerating it okay. She has been more short of breath today     REVIEW OF SYSTEMS:      Review of Systems   Constitutional: Positive for fatigue. Negative for chills, diaphoresis and unexpected weight change. HENT: Negative for congestion, ear discharge, ear pain, facial swelling, hearing loss, rhinorrhea and trouble swallowing. Eyes: Negative for photophobia, discharge, redness and visual disturbance. Respiratory: Negative for apnea, cough, choking, chest tightness, shortness of breath and stridor. Cardiovascular: Negative for chest pain and palpitations. Gastrointestinal: Negative for abdominal pain, blood in stool, diarrhea and nausea. Endocrine: Negative for polydipsia, polyphagia and polyuria. Genitourinary: Negative for difficulty urinating, dysuria, frequency, hematuria, menstrual problem and vaginal discharge. Musculoskeletal: Negative for arthralgias, joint swelling, myalgias and neck stiffness. Skin: Negative for color change and rash. Allergic/Immunologic: Negative for immunocompromised state. Neurological: Negative for dizziness, seizures, speech difficulty, light-headedness and headaches. Hematological: Negative for adenopathy. Psychiatric/Behavioral: Negative for agitation, hallucinations and suicidal ideas. Past Medical History: All past medical history reviewed today. History reviewed. No pertinent past medical history. Past Surgical History: All past surgical history was reviewed today. Past Surgical History:   Procedure Laterality Date    SIGMOIDOSCOPY N/A 5/25/2021    SIGMOIDOSCOPY DIAGNOSTIC FLEXIBLE performed by Bibi Aquino MD at [id number] [facility]       Family History: All family history was reviewed today. Family history unknown:  Yes Objective:       PHYSICAL EXAM:      Vitals:   Vitals:    05/26/21 0400 05/26/21 0515 05/26/21 0920 05/26/21 1227   BP: 108/69  123/74 110/73   Pulse: 89 111 101 90   Resp: 22 18 18   Temp:  99.8 °F (37.7 °C) 97.6 °F (36.4 °C) 99.1 °F (37.3 °C)   TempSrc:  Oral Temporal Oral   SpO2: 92% 92%  96%   Weight:       Height:           Physical Exam         Lines: All vascular access sites are healthy with no local erythema, discharge or tenderness. Intake and output:    I/O last 3 completed shifts: In: 1837 [P.O.:240; I.V.:1297; IV Piggyback:300]  Out: 900 [Urine:900]    Lab Data:   All available labs and old records have been reviewed by me. CBC:  Recent Labs     05/24/21  0030 05/25/21  0437 05/26/21  0611   WBC 12.2* 14.4* 10.9   RBC 4.60 3.90* 3.87*   HGB 13.0 11.0* 11.0*   HCT 39.2 33.8* 32.9*    128* 135   MCV 85.1 86.8 84.9   MCH 28.3 28.2 28.5   MCHC 33.2 32.5 33.5   RDW 13.7 14.0 14.3        BMP:  Recent Labs     05/24/21  0030 05/25/21  0437 05/26/21  0611    135* 135*   K 4.0 3.5 3.5    102 101   CO2 20* 22 24   BUN 11 14 11   CREATININE 0.7 0.7 0.7   CALCIUM 9.4 8.3 8.6   GLUCOSE 142* 121* 115*        Hepatic Function Panel:   Lab Results   Component Value Date    ALKPHOS 89 05/26/2021    ALT 51 05/26/2021    AST 37 05/26/2021    PROT 6.3 05/26/2021    BILITOT 0.9 05/26/2021    LABALBU 3.5 05/26/2021       CPK: No results found for: CKTOTAL  ESR: No results found for: SEDRATE  CRP: No results found for: CRP        Imaging: All pertinent images and reports for the current visit were reviewed by me during this visit. US GALLBLADDER RUQ   Final Result   Status post cholecystectomy. No significant biliary ductal dilatation      Heterogeneous echotexture throughout the liver, either technical or due to   diffuse hepatocellular disease      Small right-sided pleural effusion         US NON OB TRANSVAGINAL   Final Result   Poor visualization of the ovaries.   Nodular density in clarification, please do not hesitate to contact me through [facility] or at the phone number provided below with my electronic signature. Any pictures or media included in this note were obtained after taking informed verbal consent from the patient and with their approval to include those in the patient's medical record.     Jay Baum MD, MPH  5/26/2021 , 12:35 PM   [facility]   [address] [place] [address], [address] [place] [address]  Office: [phone]  Fax: [phone]  Clinic days:  Tuesday & Thursday

## 2021-05-26 NOTE — PROGRESS NOTES
Patient presents with no IV access. Patient a very difficult stick and x6 attempts have been made without success. MD paged requesting Midline. Orders placed. PICC RN called and message left.

## 2021-05-27 ENCOUNTER — APPOINTMENT (OUTPATIENT)
Dept: GENERAL RADIOLOGY | Age: 46
DRG: 871 | End: 2021-05-27
Payer: COMMERCIAL

## 2021-05-27 LAB
A/G RATIO: 1.1 (ref 1.1–2.2)
ALBUMIN SERPL-MCNC: 2.9 G/DL (ref 3.4–5)
ALP BLD-CCNC: 82 U/L (ref 40–129)
ALT SERPL-CCNC: 35 U/L (ref 10–40)
ANION GAP SERPL CALCULATED.3IONS-SCNC: 12 MMOL/L (ref 3–16)
AST SERPL-CCNC: 23 U/L (ref 15–37)
BASOPHILS ABSOLUTE: 0 K/UL (ref 0–0.2)
BASOPHILS RELATIVE PERCENT: 0.3 %
BILIRUB SERPL-MCNC: 0.5 MG/DL (ref 0–1)
BUN BLDV-MCNC: 15 MG/DL (ref 7–20)
CALCIUM SERPL-MCNC: 8.3 MG/DL (ref 8.3–10.6)
CHLORIDE BLD-SCNC: 102 MMOL/L (ref 99–110)
CO2: 24 MMOL/L (ref 21–32)
CREAT SERPL-MCNC: 0.7 MG/DL (ref 0.6–1.1)
EOSINOPHILS ABSOLUTE: 0.1 K/UL (ref 0–0.6)
EOSINOPHILS RELATIVE PERCENT: 1.7 %
GFR AFRICAN AMERICAN: >60
GFR NON-AFRICAN AMERICAN: >60
GLOBULIN: 2.6 G/DL
GLUCOSE BLD-MCNC: 97 MG/DL (ref 70–99)
HCT VFR BLD CALC: 28.3 % (ref 36–48)
HEMOGLOBIN: 9.6 G/DL (ref 12–16)
HIV AG/AB: NORMAL
HIV ANTIGEN: NORMAL
HIV-1 ANTIBODY: NORMAL
HIV-2 AB: NORMAL
LYMPHOCYTES ABSOLUTE: 0.6 K/UL (ref 1–5.1)
LYMPHOCYTES RELATIVE PERCENT: 7.8 %
MAGNESIUM: 2.1 MG/DL (ref 1.8–2.4)
MCH RBC QN AUTO: 28.6 PG (ref 26–34)
MCHC RBC AUTO-ENTMCNC: 34 G/DL (ref 31–36)
MCV RBC AUTO: 84.1 FL (ref 80–100)
MONOCYTES ABSOLUTE: 0.6 K/UL (ref 0–1.3)
MONOCYTES RELATIVE PERCENT: 7.6 %
NEUTROPHILS ABSOLUTE: 6 K/UL (ref 1.7–7.7)
NEUTROPHILS RELATIVE PERCENT: 82.6 %
PDW BLD-RTO: 14.5 % (ref 12.4–15.4)
PHOSPHORUS: 1.3 MG/DL (ref 2.5–4.9)
PLATELET # BLD: 150 K/UL (ref 135–450)
PMV BLD AUTO: 8.9 FL (ref 5–10.5)
POTASSIUM SERPL-SCNC: 3 MMOL/L (ref 3.5–5.1)
PRO-BNP: 277 PG/ML (ref 0–124)
PROCALCITONIN: 0.9 NG/ML (ref 0–0.15)
RBC # BLD: 3.36 M/UL (ref 4–5.2)
SODIUM BLD-SCNC: 138 MMOL/L (ref 136–145)
TOTAL PROTEIN: 5.5 G/DL (ref 6.4–8.2)
WBC # BLD: 7.3 K/UL (ref 4–11)

## 2021-05-27 PROCEDURE — 6370000000 HC RX 637 (ALT 250 FOR IP): Performed by: INTERNAL MEDICINE

## 2021-05-27 PROCEDURE — 99254 IP/OBS CNSLTJ NEW/EST MOD 60: CPT | Performed by: INTERNAL MEDICINE

## 2021-05-27 PROCEDURE — 84100 ASSAY OF PHOSPHORUS: CPT

## 2021-05-27 PROCEDURE — 94761 N-INVAS EAR/PLS OXIMETRY MLT: CPT

## 2021-05-27 PROCEDURE — 99233 SBSQ HOSP IP/OBS HIGH 50: CPT | Performed by: INTERNAL MEDICINE

## 2021-05-27 PROCEDURE — 2500000003 HC RX 250 WO HCPCS: Performed by: INTERNAL MEDICINE

## 2021-05-27 PROCEDURE — 87641 MR-STAPH DNA AMP PROBE: CPT

## 2021-05-27 PROCEDURE — 2580000003 HC RX 258: Performed by: INTERNAL MEDICINE

## 2021-05-27 PROCEDURE — 87449 NOS EACH ORGANISM AG IA: CPT

## 2021-05-27 PROCEDURE — 83880 ASSAY OF NATRIURETIC PEPTIDE: CPT

## 2021-05-27 PROCEDURE — 1200000000 HC SEMI PRIVATE

## 2021-05-27 PROCEDURE — 6360000002 HC RX W HCPCS: Performed by: INTERNAL MEDICINE

## 2021-05-27 PROCEDURE — 80053 COMPREHEN METABOLIC PANEL: CPT

## 2021-05-27 PROCEDURE — 74018 RADEX ABDOMEN 1 VIEW: CPT

## 2021-05-27 PROCEDURE — 83735 ASSAY OF MAGNESIUM: CPT

## 2021-05-27 PROCEDURE — 0202U NFCT DS 22 TRGT SARS-COV-2: CPT

## 2021-05-27 PROCEDURE — 36415 COLL VENOUS BLD VENIPUNCTURE: CPT

## 2021-05-27 PROCEDURE — 84145 PROCALCITONIN (PCT): CPT

## 2021-05-27 PROCEDURE — 85025 COMPLETE CBC W/AUTO DIFF WBC: CPT

## 2021-05-27 PROCEDURE — 2700000000 HC OXYGEN THERAPY PER DAY

## 2021-05-27 RX ORDER — GUAIFENESIN 600 MG/1
600 TABLET, EXTENDED RELEASE ORAL 2 TIMES DAILY
Status: DISCONTINUED | OUTPATIENT
Start: 2021-05-27 | End: 2021-05-29 | Stop reason: HOSPADM

## 2021-05-27 RX ORDER — LACTOBACILLUS RHAMNOSUS GG 10B CELL
1 CAPSULE ORAL 2 TIMES DAILY WITH MEALS
Status: DISCONTINUED | OUTPATIENT
Start: 2021-05-27 | End: 2021-05-29 | Stop reason: HOSPADM

## 2021-05-27 RX ORDER — FUROSEMIDE 10 MG/ML
40 INJECTION INTRAMUSCULAR; INTRAVENOUS 2 TIMES DAILY
Status: DISCONTINUED | OUTPATIENT
Start: 2021-05-27 | End: 2021-05-29 | Stop reason: HOSPADM

## 2021-05-27 RX ORDER — SUCRALFATE 1 G/1
1 TABLET ORAL EVERY 6 HOURS SCHEDULED
Status: DISCONTINUED | OUTPATIENT
Start: 2021-05-27 | End: 2021-05-29 | Stop reason: HOSPADM

## 2021-05-27 RX ORDER — POTASSIUM CHLORIDE 20 MEQ/1
20 TABLET, EXTENDED RELEASE ORAL
Status: COMPLETED | OUTPATIENT
Start: 2021-05-27 | End: 2021-05-27

## 2021-05-27 RX ADMIN — FUROSEMIDE 40 MG: 10 INJECTION, SOLUTION INTRAMUSCULAR; INTRAVENOUS at 10:26

## 2021-05-27 RX ADMIN — KETOROLAC TROMETHAMINE 30 MG: 30 INJECTION, SOLUTION INTRAMUSCULAR; INTRAVENOUS at 03:56

## 2021-05-27 RX ADMIN — SODIUM CHLORIDE 100 ML: 9 INJECTION, SOLUTION INTRAVENOUS at 10:21

## 2021-05-27 RX ADMIN — POLYETHYLENE GLYCOL 3350 17 G: 17 POWDER, FOR SOLUTION ORAL at 20:32

## 2021-05-27 RX ADMIN — POTASSIUM PHOSPHATE, MONOBASIC AND POTASSIUM PHOSPHATE, DIBASIC 30 MMOL: 224; 236 INJECTION, SOLUTION, CONCENTRATE INTRAVENOUS at 15:37

## 2021-05-27 RX ADMIN — KETOROLAC TROMETHAMINE 30 MG: 30 INJECTION, SOLUTION INTRAMUSCULAR; INTRAVENOUS at 15:43

## 2021-05-27 RX ADMIN — Medication 1 CAPSULE: at 16:44

## 2021-05-27 RX ADMIN — POLYETHYLENE GLYCOL 3350 17 G: 17 POWDER, FOR SOLUTION ORAL at 10:27

## 2021-05-27 RX ADMIN — GUAIFENESIN 600 MG: 600 TABLET, EXTENDED RELEASE ORAL at 20:31

## 2021-05-27 RX ADMIN — Medication 10 ML: at 20:31

## 2021-05-27 RX ADMIN — FUROSEMIDE 40 MG: 10 INJECTION, SOLUTION INTRAMUSCULAR; INTRAVENOUS at 17:26

## 2021-05-27 RX ADMIN — MEROPENEM 1000 MG: 1 INJECTION, POWDER, FOR SOLUTION INTRAVENOUS at 10:23

## 2021-05-27 RX ADMIN — KETOROLAC TROMETHAMINE 30 MG: 30 INJECTION, SOLUTION INTRAMUSCULAR; INTRAVENOUS at 20:32

## 2021-05-27 RX ADMIN — PANTOPRAZOLE SODIUM 40 MG: 40 TABLET, DELAYED RELEASE ORAL at 06:28

## 2021-05-27 RX ADMIN — Medication 10 ML: at 10:27

## 2021-05-27 RX ADMIN — MEROPENEM 1000 MG: 1 INJECTION, POWDER, FOR SOLUTION INTRAVENOUS at 01:04

## 2021-05-27 RX ADMIN — Medication 1 CAPSULE: at 10:26

## 2021-05-27 RX ADMIN — GUAIFENESIN 600 MG: 600 TABLET, EXTENDED RELEASE ORAL at 10:27

## 2021-05-27 RX ADMIN — MEROPENEM 1000 MG: 1 INJECTION, POWDER, FOR SOLUTION INTRAVENOUS at 16:43

## 2021-05-27 RX ADMIN — SODIUM CHLORIDE 25 ML: 9 INJECTION, SOLUTION INTRAVENOUS at 01:04

## 2021-05-27 RX ADMIN — ENOXAPARIN SODIUM 40 MG: 40 INJECTION SUBCUTANEOUS at 10:27

## 2021-05-27 RX ADMIN — POTASSIUM CHLORIDE 20 MEQ: 1500 TABLET, EXTENDED RELEASE ORAL at 12:50

## 2021-05-27 RX ADMIN — KETOROLAC TROMETHAMINE 30 MG: 30 INJECTION, SOLUTION INTRAMUSCULAR; INTRAVENOUS at 10:20

## 2021-05-27 RX ADMIN — SUCRALFATE 1 G: 1 TABLET ORAL at 17:26

## 2021-05-27 RX ADMIN — POTASSIUM CHLORIDE 20 MEQ: 1500 TABLET, EXTENDED RELEASE ORAL at 10:20

## 2021-05-27 RX ADMIN — SUCRALFATE 1 G: 1 TABLET ORAL at 12:48

## 2021-05-27 ASSESSMENT — PAIN SCALES - GENERAL
PAINLEVEL_OUTOF10: 0
PAINLEVEL_OUTOF10: 3

## 2021-05-27 ASSESSMENT — ENCOUNTER SYMPTOMS
ABDOMINAL PAIN: 0
SHORTNESS OF BREATH: 0
COUGH: 0
RHINORRHEA: 0
NAUSEA: 0
BLOOD IN STOOL: 0
CHOKING: 0
TROUBLE SWALLOWING: 0
STRIDOR: 0
COLOR CHANGE: 0
EYE DISCHARGE: 0
EYE REDNESS: 0
APNEA: 0
CHEST TIGHTNESS: 0
DIARRHEA: 0
FACIAL SWELLING: 0
PHOTOPHOBIA: 0

## 2021-05-27 ASSESSMENT — PAIN DESCRIPTION - PAIN TYPE: TYPE: ACUTE PAIN

## 2021-05-27 ASSESSMENT — PAIN DESCRIPTION - LOCATION: LOCATION: HEAD

## 2021-05-27 NOTE — PLAN OF CARE
Problem:  Bowel/Gastric:  Goal: Bowel function will improve  Description: Bowel function will improve  5/27/2021 1045 by Ivon Robin RN  Outcome: Ongoing  5/27/2021 1044 by Ivon Robin RN  Outcome: Ongoing  Goal: Diagnostic test results will improve  Description: Diagnostic test results will improve  5/27/2021 1045 by Ivon Robin RN  Outcome: Ongoing  5/27/2021 1044 by Ivon Robin RN  Outcome: Ongoing  Goal: Occurrences of nausea will decrease  Description: Occurrences of nausea will decrease  5/27/2021 1045 by Ivon Robin RN  Outcome: Ongoing  5/27/2021 1044 by Ivon Robin RN  Outcome: Ongoing  Goal: Occurrences of vomiting will decrease  Description: Occurrences of vomiting will decrease  5/27/2021 1045 by Ivon Robin RN  Outcome: Ongoing  5/27/2021 1044 by Ivon Robin RN  Outcome: Ongoing     Problem: Airway Clearance - Ineffective:  Goal: Clear lung sounds  Description: Clear lung sounds  Outcome: Ongoing  Goal: Ability to maintain a clear airway will improve  Description: Ability to maintain a clear airway will improve  Outcome: Ongoing     Problem: Fluid Volume - Deficit:  Goal: Achieves intake and output within specified parameters  Description: Achieves intake and output within specified parameters  Outcome: Ongoing

## 2021-05-27 NOTE — PROGRESS NOTES
Hospitalist Progress Note      PCP: No primary care provider on file. Date of Admission: 5/24/2021    Chief Complaint: Fever    Hospital Course: 53 yo F with history of recent hemorrhoid banding who came to ER with fevers. Had gone to [facility] ED and sent home. Admitted as inpatient for fever of unclear origin with sepsis. Started on IV Cipro and Flagyl for proctitis. GI and ID consulted. Echo pending. Underwent colonoscopy on 5/25 Findings:   Colon mucosa was normal. There was large amounts of stool in rectum and colon, c/w fecal impaction. Enlarged internal hemorrhoids. s/p digital disimpaction     Plans  1/2 to 1 gallon golytely for bowel evacuation. As for maintenance bowel regimen, she can have OTC Benefiber or Metamucil. If she is persistently constipated, take OTC miralax 17 grams once to twice daily. She will likely need a 2 day bowel prep for her future coloscopy with Dr. Min Callahan: Patient denies CP, SOB, HA or abdominal pain. Still no fevers and only low grade temps. On 4 L NC. Patient states she is swollen. Had some harder stools with loose stool. Said she struggled with reflux at night since surgery.       Medications:  Reviewed    Infusion Medications    sodium chloride 100 mL (05/27/21 1021)     Scheduled Medications    guaiFENesin  600 mg Oral BID    furosemide  40 mg Intravenous BID    lactobacillus  1 capsule Oral BID WC    sucralfate  1 g Oral 4 times per day    lidocaine 1 % injection  5 mL Intradermal Once    sodium chloride flush  5-40 mL Intravenous 2 times per day    ketorolac  30 mg Intravenous Q6H    polyethylene glycol  17 g Oral BID    meropenem  1,000 mg Intravenous Q8H    enoxaparin  40 mg Subcutaneous Daily    pantoprazole  40 mg Oral QAM AC    GI cocktail   Oral Once     PRN Meds: sodium chloride flush, sodium chloride, LORazepam, ondansetron, promethazine **OR** ondansetron, polyethylene glycol, acetaminophen **OR** acetaminophen, perflutren lipid microspheres, promethazine      Intake/Output Summary (Last 24 hours) at 5/27/2021 1347  Last data filed at 5/27/2021 0104  Gross per 24 hour   Intake 720 ml   Output --   Net 720 ml       Physical Exam Performed:    /86   Pulse 74   Temp 98.1 °F (36.7 °C) (Oral)   Resp 14   Ht 5' 5\" (1.651 m)   Wt 180 lb 4.8 oz (81.8 kg)   SpO2 98%   BMI 30.00 kg/m²     General appearance: No apparent distress, appears stated age and cooperative. HEENT: Pupils equal, round, and reactive to light. Conjunctivae/corneas clear. Neck: Supple, with full range of motion. No jugular venous distention. Trachea midline. Respiratory:  Normal respiratory effort. Clear to auscultation, bilaterally without Rales/Wheezes/Rhonchi. Cardiovascular: Regular rate and rhythm with normal S1/S2 without murmurs, rubs or gallops. Abdomen: Soft, non-tender, non-distended with normal bowel sounds. Musculoskeletal: No clubbing, cyanosis or edema bilaterally. Full range of motion without deformity. Skin: Skin color, texture, turgor normal.  No rashes or lesions. Neurologic:  Neurovascularly intact without any focal sensory/motor deficits.  Cranial nerves: II-XII intact, grossly non-focal.  Psychiatric: Alert and oriented, thought content appropriate, normal insight  Capillary Refill: Brisk,3 seconds, normal   Peripheral Pulses: +2 palpable, equal bilaterally       Labs:   Recent Labs     05/25/21 0437 05/26/21 0611 05/27/21  0637   WBC 14.4* 10.9 7.3   HGB 11.0* 11.0* 9.6*   HCT 33.8* 32.9* 28.3*   * 135 150     Recent Labs     05/25/21 0437 05/26/21  0611 05/27/21  0637   * 135* 138   K 3.5 3.5 3.0*    101 102   CO2 22 24 24   BUN 14 11 15   CREATININE 0.7 0.7 0.7   CALCIUM 8.3 8.6 8.3   PHOS 1.9* 1.3* 1.3*     Recent Labs     05/25/21 0437 05/26/21 0611 05/27/21  0637   AST 41* 37 23   ALT 54* 51* 35   BILITOT 1.0 0.9 0.5   ALKPHOS 82 89 82     No results for input(s): INR in the last 72 Abdominal pain, right upper quadrant [R10.11]     Transaminitis [R74.01]     Overweight (BMI 25.0-29. 9) [E66.3]      1. Cont Merrem for suspected proctitis  2. Lasix 40 mg IV bid  3. Echo without vegetations  4. ID consult for fevers appreciated  5. GI consult for proctitis appreciated  6. Add Protonix and Carafate for GERD  7. Pulm consult for cough/SOB/Fevers  8. KUB today  9. Tylenol PRN pain  10. Cont Toradol IV X 8 doses  11. Cont Miralax bid  12. Replete K and Phos  13. Midline for access  14. Ativan IV PRN anxiety/insomnia     DVT Prophylaxis: Lovenox  Diet: DIET GENERAL;  Code Status: Full Code    PT/OT Eval Status: Not needed    Dispo - Home    Discussed with patient, Claudean Raspberry (GI NP), nursing and CM. D/W mother at bedside. Patient and mother updated in detail about current findings AGAIN today. I still suspect she has struggled with impaction since hemorrhoid surgery and that is primary problem here. She likely aspirated from GERD as result of impaction.     Baldev Villalobos MD

## 2021-05-27 NOTE — PROGRESS NOTES
Message sent to hospitalist:    Good morning. Overnight pt desat to 81, on 3L o2 now. CT from last night shows pneumonia. Also I was wondering if you wanted me to replace her potassium and phosphorous today. K+ was 3.0 and phos was 1.3.

## 2021-05-27 NOTE — PROGRESS NOTES
- GI not convinced that she has proctitis  · Urinary tract infection-urine culture negative so far  · Transaminitis-resolved  · Overweight due to excess calorie intake : Body mass index is 25.79 kg/m². Discussion:      The patient is on IV meropenem. She has become afebrile after initiation of IV meropenem. White cell count continues to improve and is 7300 today. T-max was 99 today    Interestingly, blood cultures have still been negative    Serum creatinine 0.7. HIV screen was negative    CT angiogram images of the chest reviewed. Shows bibasilar pneumonia. She is on 3 to 4 L oxygen via nasal cannula    Plan:     Diagnostic Workup:    · Will order resp viral panel for thoroughness  · Urine legionella and pneumococcal antigen  · Add procalcitonin and BNP to morning labs  · Nasal MRSA probe  · Continue to follow  fever curve, WBC count and blood cultures. · Continue to monitor blood counts, liver and renal function. Antimicrobials:    · Will plan to switch IV meropenem to IV ertapenem 1 g every 24 hour today  · If the patient continues to do well on IV ertapenem from fever and leukocytosis standpoint, will plan for around 1 week of IV ertapenem at discharge to complete empiric antibiotic course  · Patient already has a midline in place in the left arm  · We will follow up on the culture results and clinical progress and will make further recommendations accordingly. · Continue close vitals monitoring. · Maintain good glycemic control. · Fall precautions. Aspiration precautions. · Continue to watch for new fever or diarrhea. · DVT prophylaxis. · Discussed all above with patient and RN.   · Discussed with patient's mother at bedside  · Discussed with Dr. Tadeo Carrizales      Drug Monitoring:    · Continue monitoring for antibiotic toxicity as follows: CBC, CMP   · Continue to watch for following: new or worsening fever, new hypotension, hives, lip swelling and redness or purulence at vascular access sites. I/v access Management:    · Continue to monitor i.v access sites for erythema, induration, discharge or tenderness. · As always, continue efforts to minimize tubes/lines/drains as clinically appropriate to reduce chances of line associated infections. Patient education and counseling:        · The patient was educated in detail about the side-effects of various antibiotics and things to watch for like new rashes, lip swelling, severe reaction, worsening diarrhea, break through fever etc.  · Discussed patient's condition and what to expect. All of the patient's questions were addressed in a satisfactory manner and patient verbalized understanding all instructions. Level of complexity of visit: High     Risk of Complications/Morbidity: High     · Illness(es)/ Infection present that pose threat to life/bodily function. · There is potential for severe exacerbation of infection/side effects of treatment. · Therapy requires intensive monitoring for antimicrobial agent toxicity. TIME SPENT TODAY:     - Spent over  36 minutes on visit (including interval history, physical exam, review of data including labs, cultures, imaging, development and implementation of treatment plan and coordination of complex care). More than 50 percent of this includes face-to-face time spent with the patient for counseling and coordination of care. Thank you for involving me in the care of your patient. I will continue to follow. If you have anyadditional questions, please do not hesitate to contact me. Subjective: Interval history: Interval history was obtained from chart review and patient/ RN. He is finally afebrile today. She is on IV meropenem. She seems to be tolerating it okay. She has been more short of breath today     REVIEW OF SYSTEMS:      Review of Systems   Constitutional: Positive for fatigue. Negative for chills, diaphoresis and unexpected weight change.    HENT: Negative for congestion, ear discharge, ear pain, facial swelling, hearing loss, rhinorrhea and trouble swallowing. Eyes: Negative for photophobia, discharge, redness and visual disturbance. Respiratory: Negative for apnea, cough, choking, chest tightness, shortness of breath and stridor. Cardiovascular: Negative for chest pain and palpitations. Gastrointestinal: Negative for abdominal pain, blood in stool, diarrhea and nausea. Endocrine: Negative for polydipsia, polyphagia and polyuria. Genitourinary: Negative for difficulty urinating, dysuria, frequency, hematuria, menstrual problem and vaginal discharge. Musculoskeletal: Negative for arthralgias, joint swelling, myalgias and neck stiffness. Skin: Negative for color change and rash. Allergic/Immunologic: Negative for immunocompromised state. Neurological: Negative for dizziness, seizures, speech difficulty, light-headedness and headaches. Hematological: Negative for adenopathy. Psychiatric/Behavioral: Negative for agitation, hallucinations and suicidal ideas. Past Medical History: All past medical history reviewed today. History reviewed. No pertinent past medical history. Past Surgical History: All past surgical history was reviewed today. Past Surgical History:   Procedure Laterality Date    SIGMOIDOSCOPY N/A 5/25/2021    SIGMOIDOSCOPY DIAGNOSTIC FLEXIBLE performed by Prosper Gonzalez MD at [address] [place] [facility] ENDOSCOPY       Family History: All family history was reviewed today. Family history unknown: Yes       Objective:       PHYSICAL EXAM:      Vitals:   Vitals:    05/27/21 0550 05/27/21 0845 05/27/21 1015 05/27/21 1245   BP:   123/86    Pulse:   74    Resp:   14    Temp:   98.1 °F (36.7 °C)    TempSrc:   Oral    SpO2: 91% 91% 97% 98%   Weight:       Height:           Physical Exam         Lines: All vascular access sites are healthy with no local erythema, discharge or tenderness.     Intake and output:    I/O last 3 completed shifts: In: 5 [P.O.:720]  Out: -     Lab Data:   All available labs and old records have been reviewed by me. CBC:  Recent Labs     05/25/21 0437 05/26/21  0611 05/27/21  0637   WBC 14.4* 10.9 7.3   RBC 3.90* 3.87* 3.36*   HGB 11.0* 11.0* 9.6*   HCT 33.8* 32.9* 28.3*   * 135 150   MCV 86.8 84.9 84.1   MCH 28.2 28.5 28.6   MCHC 32.5 33.5 34.0   RDW 14.0 14.3 14.5        BMP:  Recent Labs     05/25/21  0437 05/26/21  0611 05/27/21  0637   * 135* 138   K 3.5 3.5 3.0*    101 102   CO2 22 24 24   BUN 14 11 15   CREATININE 0.7 0.7 0.7   CALCIUM 8.3 8.6 8.3   GLUCOSE 121* 115* 97        Hepatic Function Panel:   Lab Results   Component Value Date    ALKPHOS 82 05/27/2021    ALT 35 05/27/2021    AST 23 05/27/2021    PROT 5.5 05/27/2021    BILITOT 0.5 05/27/2021    LABALBU 2.9 05/27/2021       CPK: No results found for: CKTOTAL  ESR: No results found for: SEDRATE  CRP: No results found for: CRP        Imaging: All pertinent images and reports for the current visit were reviewed by me during this visit. XR ABDOMEN (KUB) (SINGLE AP VIEW)   Final Result      CT CHEST PULMONARY EMBOLISM W CONTRAST   Final Result   1. No acute pulmonary embolism to the segmental arteries. 2. Multifocal airspace disease. Correlate with presentation for pneumonia. Follow-up to resolution recommended. 3. Other findings as described. US GALLBLADDER RUQ   Final Result   Status post cholecystectomy. No significant biliary ductal dilatation      Heterogeneous echotexture throughout the liver, either technical or due to   diffuse hepatocellular disease      Small right-sided pleural effusion         US NON OB TRANSVAGINAL   Final Result   Poor visualization of the ovaries. Nodular density in the right adnexa is   seen, difficult to determine as to whether not this represents right ovary or   large fibroid. Small amount of free fluid in pelvis.          XR ABDOMEN (KUB) (SINGLE AP VIEW)   Final Result Unremarkable abdomen. CT ABDOMEN PELVIS W IV CONTRAST Additional Contrast? None   Final Result   1. Wall thickening of the rectum with surrounding stranding. Infection and   inflammation are in the differential.   2. Lobulated enlarged uterus may be due to fibroids. Ovaries are not well   visualized. Ultrasound or MRI would better evaluate. 3. Other findings as described. XR CHEST PORTABLE   Final Result   Negative portable chest.             Medications: All current and past medications were reviewed.  guaiFENesin  600 mg Oral BID    furosemide  40 mg Intravenous BID    lactobacillus  1 capsule Oral BID WC    sucralfate  1 g Oral 4 times per day    potassium phosphate IVPB  30 mmol Intravenous Once    lidocaine 1 % injection  5 mL Intradermal Once    sodium chloride flush  5-40 mL Intravenous 2 times per day    ketorolac  30 mg Intravenous Q6H    polyethylene glycol  17 g Oral BID    meropenem  1,000 mg Intravenous Q8H    enoxaparin  40 mg Subcutaneous Daily    pantoprazole  40 mg Oral QAM AC    GI cocktail   Oral Once        sodium chloride 100 mL (05/27/21 1021)       sodium chloride flush, sodium chloride, LORazepam, ondansetron, promethazine **OR** ondansetron, polyethylene glycol, acetaminophen **OR** acetaminophen, perflutren lipid microspheres, promethazine      Problem list:       Patient Active Problem List   Diagnosis Code    Acute febrile illness R50.9    Septicemia ([facility] [address] A41.9    Abdominal pain R10.9    Proctitis K62.89    Acute cystitis without hematuria N30.00    Abdominal pain, right upper quadrant R10.11    Transaminitis R74.01    Overweight (BMI 25.0-29. 9) E66.3       Please note that this chart was generated using Dragon dictation software. Although every effort was made to ensure the accuracy of this automated transcription, some errors in transcription may have occurred inadvertently.  If you may need any clarification, please do not hesitate to contact me through [employer] or at the phone number provided below with my electronic signature. Any pictures or media included in this note were obtained after taking informed verbal consent from the patient and with their approval to include those in the patient's medical record.     Jewel Prater MD, MPH  5/27/2021 , 3:29 PM   [facility]   [address] [place] [address]  Office: [phone]  Fax: [phone]  Clinic days:  Tuesday & Thursday

## 2021-05-27 NOTE — CONSULTS
[facility] Pulmonary and Critical Care   Consult Note      Reason for Consult: Abnormal CT scan and worsening O2 needs   Requesting Physician: Dennys Pierre    Subjective:   CHIEF COMPLAINT: Abdominal pain, nausea and vomiting     HPI: Patient originally was admitted with abdominal pain, nausea and vomiting and thought to have stool impaction related to recent hemorrhoidal banding. Patient underwent colonoscopy on 5/25 which showed large amount of stool in rectum and colon requiring digital disimpaction and was also later treated with GoLYTELY/stool softeners. Through the hospitalization patient has been complaining of shortness of breath, with increased body swelling, which she attributes to IV fluids. No significant cough or phlegm. Fevers have been documented up to 102F. CT chest obtained today suggestive of bilateral multifocal infiltrates, concerning for pneumonia. Hence pulmonary consultation has been requested. Currently states that her belly feels bloated, but denies any nausea, vomiting or abdominal pain. Has been moving bowels. Never smoked in the past, no prior history of pneumonia. No history of asthma. The patient is a 54 y.o. female with significant past medical history of:  History reviewed. No pertinent past medical history.      Past Surgical History:        Procedure Laterality Date    SIGMOIDOSCOPY N/A 5/25/2021    SIGMOIDOSCOPY DIAGNOSTIC FLEXIBLE performed by Karina Barry MD at [address] [facility] ENDOSCOPY     Current Medications:    Current Facility-Administered Medications: guaiFENesin (MUCINEX) extended release tablet 600 mg, 600 mg, Oral, BID  furosemide (LASIX) injection 40 mg, 40 mg, Intravenous, BID  lactobacillus (CULTURELLE) capsule 1 capsule, 1 capsule, Oral, BID WC  sucralfate (CARAFATE) tablet 1 g, 1 g, Oral, 4 times per day  lidocaine PF 1 % injection 5 mL, 5 mL, Intradermal, Once  sodium chloride flush 0.9 % injection 5-40 mL, 5-40 mL, Intravenous, 2 times per day  sodium chloride flush 0.9 % injection 5-40 mL, 5-40 mL, Intravenous, PRN  0.9 % sodium chloride infusion, 25 mL, Intravenous, PRN  ketorolac (TORADOL) injection 30 mg, 30 mg, Intravenous, Q6H  LORazepam (ATIVAN) injection 0.5 mg, 0.5 mg, Intravenous, Q4H PRN  polyethylene glycol (GLYCOLAX) packet 17 g, 17 g, Oral, BID  ondansetron (ZOFRAN-ODT) disintegrating tablet 4 mg, 4 mg, Oral, Q8H PRN  meropenem (MERREM) 1,000 mg in sodium chloride 0.9 % 100 mL IVPB (mini-bag), 1,000 mg, Intravenous, Q8H  enoxaparin (LOVENOX) injection 40 mg, 40 mg, Subcutaneous, Daily  promethazine (PHENERGAN) tablet 12.5 mg, 12.5 mg, Oral, Q6H PRN **OR** ondansetron (ZOFRAN) injection 4 mg, 4 mg, Intravenous, Q6H PRN  polyethylene glycol (GLYCOLAX) packet 17 g, 17 g, Oral, Daily PRN  acetaminophen (TYLENOL) tablet 650 mg, 650 mg, Oral, Q6H PRN **OR** acetaminophen (TYLENOL) suppository 650 mg, 650 mg, Rectal, Q6H PRN  pantoprazole (PROTONIX) tablet 40 mg, 40 mg, Oral, QAM AC  aluminum & magnesium hydroxide-simethicone (MAALOX) 30 mL, lidocaine viscous hcl (XYLOCAINE) 5 mL (GI COCKTAIL), , Oral, Once  perflutren lipid microspheres (DEFINITY) injection 1.65 mg, 1.5 mL, Intravenous, ONCE PRN  promethazine (PHENERGAN) injection 12.5 mg, 12.5 mg, Intravenous, Q4H PRN    Allergies   Allergen Reactions    Codeine Nausea Only       Social History:    TOBACCO:   reports that she has never smoked. She has never used smokeless tobacco.  ETOH:   reports current alcohol use.   Patient currently lives independently    Family History:       Family history unknown: Yes       REVIEW OF SYSTEMS:    Constitutional: Fevers  Ears, nose, mouth, throat: negative for ear drainage, epistaxis, hoarseness, nasal congestion, sore throat and voice change  Respiratory: negative except for shortness of breath  Cardiovascular: Leg edema+,  negative for chest pain, chest pressure/discomfort, irregular heart beat and palpitations  Gastrointestinal: Generalized abdominal pain, nausea, cyanosis, clubbing or edema  Musculoskeletal: normal range of motion, no joint swelling, deformity or tenderness  Neurologic: alert, no focal neurologic deficits    Data Review:  CBC:   Lab Results   Component Value Date    WBC 7.3 05/27/2021    RBC 3.36 05/27/2021     BMP:   Lab Results   Component Value Date    GLUCOSE 97 05/27/2021    CO2 24 05/27/2021    BUN 15 05/27/2021    CREATININE 0.7 05/27/2021    CALCIUM 8.3 05/27/2021     ABG: No results found for: FQP0INY, BEART, Z6VGKJYU, PHART, THGBART, IHB4QMA, PO2ART, ZYY6CAY    Radiology: All pertinent images / reports were reviewed as a part of this visit. Narrative   EXAMINATION:   CTA OF THE CHEST 5/26/2021 5:50 pm       TECHNIQUE:   CTA of the chest was performed after the administration of intravenous   contrast.  Multiplanar reformatted images are provided for review.  MIP   images are provided for review. Dose modulation, iterative reconstruction,   and/or weight based adjustment of the mA/kV was utilized to reduce the   radiation dose to as low as reasonably achievable.       COMPARISON:   None.       HISTORY:   ORDERING SYSTEM PROVIDED HISTORY: SOB   TECHNOLOGIST PROVIDED HISTORY:   Reason for exam:->SOB   Reason for Exam: SOB   Acuity: Unknown   Type of Exam: Unknown       FINDINGS:   Pulmonary Arteries: Pulmonary arteries are within normal limits in size. .  No   filling defect is identified in the pulmonary arteries to the level of   thesegmental arteries.       Mediastinum: Heart is within normal limits in size. Trace pericardial   effusion or thickening.  Aorta is within normal limits in size. No luminal   defect is appreciated in the visualized thoracic aorta given motion artifact.       Lungs/pleura: Central airways are patent.  Ground-glass airspace disease with   regions of interlobular septal thickening.  Confluent airspace disease in the   lower lobes.  Small pleural effusions.  No pneumothorax.       Soft Tissues/Bones: Prominent mediastinal

## 2021-05-27 NOTE — PROGRESS NOTES
nausea, vomiting -for 3 days. CT with wall thickening of the rectum with stranding. ?  If this could be related to recent hemorrhoidal banding or represent proctitis. Noted to have mild elevation of liver enzymes. She is status post cholecystectomy. Negative for hepatitis A, B, C. Flex sig showed fecal impaction, now having BM following disimpaction and moviprep. Fever -ID consulted, CT yesterday shows pneumonia. PLAN :  - Monitor Liver enzymes  - OTC benefiber or metamucil  - OTC miralax as needed  - Abx per ID  - advance diet as tolerated. Discussed with Dr. Agustin Flowers, [address] [place] [address]    I have personally performed a face to face diagnostic evaluation on this patient. I have interviewed and examined the patient and I agree with the findings and recommended plan of care. In summary, my findings and plan are the following:     Patient feeling better today. She is no longer nauseous after having large amounts of bowel movements after taking GoLYTELY. She denies any abdominal pain or anorectal pain. No recurrent GI bleeding. CT of the chest yesterday ruled out PE but shows pneumonia. Vital signs are stable. Abdomen is soft nontender no rebound or guarding. Patient may advance her diet. She can take over-the-counter Benefiber or Metamucil to help with her constipation. If she is persistently constipated, she will need MiraLAX 17 g once or twice a day. Follow-up with Dr. Flynn Edward in clinic for further hemorrhoidal banding and colonoscopy. GI will sign off. Please call if you have any questions.     Silvana Delgado MD, MSc  [facility]  05/27/21

## 2021-05-28 ENCOUNTER — APPOINTMENT (OUTPATIENT)
Dept: GENERAL RADIOLOGY | Age: 46
DRG: 871 | End: 2021-05-28
Payer: COMMERCIAL

## 2021-05-28 LAB
A/G RATIO: 1.1 (ref 1.1–2.2)
ALBUMIN SERPL-MCNC: 3.2 G/DL (ref 3.4–5)
ALP BLD-CCNC: 92 U/L (ref 40–129)
ALT SERPL-CCNC: 37 U/L (ref 10–40)
ANION GAP SERPL CALCULATED.3IONS-SCNC: 10 MMOL/L (ref 3–16)
AST SERPL-CCNC: 29 U/L (ref 15–37)
BASOPHILS ABSOLUTE: 0 K/UL (ref 0–0.2)
BASOPHILS RELATIVE PERCENT: 0.7 %
BILIRUB SERPL-MCNC: 0.4 MG/DL (ref 0–1)
BLOOD CULTURE, ROUTINE: NORMAL
BUN BLDV-MCNC: 17 MG/DL (ref 7–20)
CALCIUM SERPL-MCNC: 8.5 MG/DL (ref 8.3–10.6)
CHLORIDE BLD-SCNC: 102 MMOL/L (ref 99–110)
CO2: 27 MMOL/L (ref 21–32)
CREAT SERPL-MCNC: 0.7 MG/DL (ref 0.6–1.1)
CULTURE, BLOOD 2: NORMAL
EOSINOPHILS ABSOLUTE: 0.1 K/UL (ref 0–0.6)
EOSINOPHILS RELATIVE PERCENT: 1.3 %
GFR AFRICAN AMERICAN: >60
GFR NON-AFRICAN AMERICAN: >60
GLOBULIN: 2.9 G/DL
GLUCOSE BLD-MCNC: 103 MG/DL (ref 70–99)
HCT VFR BLD CALC: 31.3 % (ref 36–48)
HEMOGLOBIN: 10.6 G/DL (ref 12–16)
L. PNEUMOPHILA SEROGP 1 UR AG: NORMAL
LYMPHOCYTES ABSOLUTE: 0.8 K/UL (ref 1–5.1)
LYMPHOCYTES RELATIVE PERCENT: 13.2 %
MCH RBC QN AUTO: 28.8 PG (ref 26–34)
MCHC RBC AUTO-ENTMCNC: 33.8 G/DL (ref 31–36)
MCV RBC AUTO: 85 FL (ref 80–100)
MONOCYTES ABSOLUTE: 0.5 K/UL (ref 0–1.3)
MONOCYTES RELATIVE PERCENT: 8.3 %
MRSA SCREEN RT-PCR: NORMAL
NEUTROPHILS ABSOLUTE: 4.9 K/UL (ref 1.7–7.7)
NEUTROPHILS RELATIVE PERCENT: 76.5 %
PDW BLD-RTO: 14.3 % (ref 12.4–15.4)
PHOSPHORUS: 2.6 MG/DL (ref 2.5–4.9)
PLATELET # BLD: 199 K/UL (ref 135–450)
PMV BLD AUTO: 8.6 FL (ref 5–10.5)
POTASSIUM SERPL-SCNC: 3.2 MMOL/L (ref 3.5–5.1)
RBC # BLD: 3.68 M/UL (ref 4–5.2)
REPORT: NORMAL
RESPIRATORY PANEL PCR: NORMAL
SODIUM BLD-SCNC: 139 MMOL/L (ref 136–145)
STREP PNEUMONIAE ANTIGEN, URINE: NORMAL
TOTAL PROTEIN: 6.1 G/DL (ref 6.4–8.2)
WBC # BLD: 6.4 K/UL (ref 4–11)

## 2021-05-28 PROCEDURE — 6360000002 HC RX W HCPCS: Performed by: INTERNAL MEDICINE

## 2021-05-28 PROCEDURE — 2580000003 HC RX 258: Performed by: INTERNAL MEDICINE

## 2021-05-28 PROCEDURE — 1200000000 HC SEMI PRIVATE

## 2021-05-28 PROCEDURE — 71045 X-RAY EXAM CHEST 1 VIEW: CPT

## 2021-05-28 PROCEDURE — 2700000000 HC OXYGEN THERAPY PER DAY

## 2021-05-28 PROCEDURE — 6370000000 HC RX 637 (ALT 250 FOR IP): Performed by: INTERNAL MEDICINE

## 2021-05-28 PROCEDURE — 85025 COMPLETE CBC W/AUTO DIFF WBC: CPT

## 2021-05-28 PROCEDURE — 6370000000 HC RX 637 (ALT 250 FOR IP): Performed by: NURSE PRACTITIONER

## 2021-05-28 PROCEDURE — 99233 SBSQ HOSP IP/OBS HIGH 50: CPT | Performed by: INTERNAL MEDICINE

## 2021-05-28 PROCEDURE — 94761 N-INVAS EAR/PLS OXIMETRY MLT: CPT

## 2021-05-28 PROCEDURE — 84100 ASSAY OF PHOSPHORUS: CPT

## 2021-05-28 PROCEDURE — 99232 SBSQ HOSP IP/OBS MODERATE 35: CPT | Performed by: INTERNAL MEDICINE

## 2021-05-28 PROCEDURE — 80053 COMPREHEN METABOLIC PANEL: CPT

## 2021-05-28 RX ORDER — POTASSIUM CHLORIDE 20 MEQ/1
40 TABLET, EXTENDED RELEASE ORAL ONCE
Status: COMPLETED | OUTPATIENT
Start: 2021-05-28 | End: 2021-05-28

## 2021-05-28 RX ADMIN — MEROPENEM 1000 MG: 1 INJECTION, POWDER, FOR SOLUTION INTRAVENOUS at 00:25

## 2021-05-28 RX ADMIN — Medication 1 CAPSULE: at 09:03

## 2021-05-28 RX ADMIN — SUCRALFATE 1 G: 1 TABLET ORAL at 12:14

## 2021-05-28 RX ADMIN — PANTOPRAZOLE SODIUM 40 MG: 40 TABLET, DELAYED RELEASE ORAL at 06:09

## 2021-05-28 RX ADMIN — SUCRALFATE 1 G: 1 TABLET ORAL at 00:25

## 2021-05-28 RX ADMIN — SUCRALFATE 1 G: 1 TABLET ORAL at 17:54

## 2021-05-28 RX ADMIN — FUROSEMIDE 40 MG: 10 INJECTION, SOLUTION INTRAMUSCULAR; INTRAVENOUS at 17:54

## 2021-05-28 RX ADMIN — POTASSIUM CHLORIDE 40 MEQ: 1500 TABLET, EXTENDED RELEASE ORAL at 09:11

## 2021-05-28 RX ADMIN — SUCRALFATE 1 G: 1 TABLET ORAL at 06:09

## 2021-05-28 RX ADMIN — POLYETHYLENE GLYCOL 3350 17 G: 17 POWDER, FOR SOLUTION ORAL at 11:47

## 2021-05-28 RX ADMIN — FUROSEMIDE 40 MG: 10 INJECTION, SOLUTION INTRAMUSCULAR; INTRAVENOUS at 09:02

## 2021-05-28 RX ADMIN — ACETAMINOPHEN 650 MG: 325 TABLET ORAL at 21:10

## 2021-05-28 RX ADMIN — KETOROLAC TROMETHAMINE 30 MG: 30 INJECTION, SOLUTION INTRAMUSCULAR; INTRAVENOUS at 09:02

## 2021-05-28 RX ADMIN — Medication 1 CAPSULE: at 17:54

## 2021-05-28 RX ADMIN — Medication 10 ML: at 21:15

## 2021-05-28 RX ADMIN — ENOXAPARIN SODIUM 40 MG: 40 INJECTION SUBCUTANEOUS at 09:03

## 2021-05-28 RX ADMIN — KETOROLAC TROMETHAMINE 30 MG: 30 INJECTION, SOLUTION INTRAMUSCULAR; INTRAVENOUS at 02:42

## 2021-05-28 RX ADMIN — GUAIFENESIN 600 MG: 600 TABLET, EXTENDED RELEASE ORAL at 21:10

## 2021-05-28 RX ADMIN — LORAZEPAM 0.5 MG: 2 INJECTION INTRAMUSCULAR; INTRAVENOUS at 21:15

## 2021-05-28 RX ADMIN — ERTAPENEM SODIUM 1000 MG: 1 INJECTION, POWDER, LYOPHILIZED, FOR SOLUTION INTRAMUSCULAR; INTRAVENOUS at 11:39

## 2021-05-28 RX ADMIN — GUAIFENESIN 600 MG: 600 TABLET, EXTENDED RELEASE ORAL at 09:03

## 2021-05-28 RX ADMIN — SODIUM CHLORIDE 25 ML: 9 INJECTION, SOLUTION INTRAVENOUS at 00:24

## 2021-05-28 ASSESSMENT — ENCOUNTER SYMPTOMS
APNEA: 0
NAUSEA: 0
PHOTOPHOBIA: 0
COLOR CHANGE: 0
STRIDOR: 0
SHORTNESS OF BREATH: 0
BLOOD IN STOOL: 0
ABDOMINAL PAIN: 0
COUGH: 0
TROUBLE SWALLOWING: 0
FACIAL SWELLING: 0
CHOKING: 0
RHINORRHEA: 0
EYE DISCHARGE: 0
DIARRHEA: 0
EYE REDNESS: 0
CHEST TIGHTNESS: 0

## 2021-05-28 ASSESSMENT — PAIN SCALES - GENERAL
PAINLEVEL_OUTOF10: 2
PAINLEVEL_OUTOF10: 0
PAINLEVEL_OUTOF10: 3
PAINLEVEL_OUTOF10: 0
PAINLEVEL_OUTOF10: 0
PAINLEVEL_OUTOF10: 2
PAINLEVEL_OUTOF10: 0

## 2021-05-28 ASSESSMENT — PAIN DESCRIPTION - PAIN TYPE: TYPE: ACUTE PAIN

## 2021-05-28 NOTE — CARE COORDINATION
ARIAN called and updated Mala Larson with [facility] that per NP patient will probably discharge tomorrow. He is working on home care agency. Orders for hc in chart. Cm updated patient that she is covered at 100% for IV abx.      Brad Cruz RN, BSN  [phone]

## 2021-05-28 NOTE — CARE COORDINATION
Discharge Planning Assessment  Discharge Planning Assessment  RN/SW discharge planner met with patient/ (and family member) to discuss reason for admission, current living situation, and potential needs at the time of discharge    Demographics/Insurance verified Yes CM updated phone number to correct number of : [phone]. Patient has PCP Kris Guerin CM also updated in demographics     Current type of dwelling: [facility] home with one step to porch     Patient from ECF/SW confirmed with: n/a     Living arrangements: lives with parents     Level of function/Support: Independent with adls, family is supportive     PCP: Kris Guerin     Last Visit to PCP:    DME: none on 2 liters of oxygen here, may need home oxygen eval at d/c     Active with any community resources/agencies/skilled home care: none     Medication compliance issues: Independent     Financial issues that could impact healthcare: none         Tentative discharge plan:home with possible IV abx- see CM note     Discussed and provided facilities of choice if transition to a skilled nursing facility is required at the time of discharge no therapy ordered at this time. Not anticipated. Discussed with patient and/or family that on the day of discharge home tentative time of discharge will be between 10 AM and noon. Transportation at the time of discharge: family in private vehicle.      Omer Toledo RN, BSN  [phone]

## 2021-05-28 NOTE — CARE COORDINATION
CM met with patient and verified her PCP information and update in system along with correct phone number. CM discussed poc and process for IV home abx. Pt verbalized understanding. CM called and left vm for Herminio Hills [facility] [phone] to inform per ID note yesterday that pt will d/c on 1 week of ertapenem at discharge. Pt currently on 2 liters of oxygen, no use at home. CM will monitor for oxygen needs at discharge.     Barbara Field RN, BSN  [phone]

## 2021-05-28 NOTE — PLAN OF CARE
Problem: Activity:  Goal: Risk for activity intolerance will decrease  Description: Risk for activity intolerance will decrease  Outcome: Ongoing     Problem: Bowel/Gastric:  Goal: Bowel function will improve  Description: Bowel function will improve  Outcome: Ongoing     Problem:  Bowel/Gastric:  Goal: Occurrences of nausea will decrease  Description: Occurrences of nausea will decrease  Outcome: Ongoing     Problem: Health Behavior:  Goal: Ability to state signs and symptoms to report to health care provider will improve  Description: Ability to state signs and symptoms to report to health care provider will improve  Outcome: Ongoing

## 2021-05-28 NOTE — DISCHARGE INSTR - COC
Continuity of Care Form    Patient Name: Rocio Castle   :  1975  MRN:  [id number]    Admit date:  2021  Discharge date:  21    Code Status Order: Full Code   Advance Directives:   [facility] Documentation       Date/Time Healthcare Directive Type of Healthcare Directive Copy in [facility] [address] Agent's Name Healthcare Agent's Phone Number    21 1000  Yes, patient has an advance directive for healthcare treatment --  No, copy requested from clinic -- -- --    21 0701  No, patient does not have an advance directive for healthcare treatment --  -- -- -- --            Admitting Physician:  Sadie Guerrero MD  PCP: Kareem Saleh DO    Discharging Nurse: Rosy Regan RN BSN  [address] Unit/Room#: 9UE-7888/6398-39  Discharging Unit Phone Number: [phone]    Emergency Contact:   Extended Emergency Contact Information  Primary Emergency Contact: Merced Orozco  Mobile Phone: [phone]  Relation: Parent    Past Surgical History:  Past Surgical History:   Procedure Laterality Date    SIGMOIDOSCOPY N/A 2021    SIGMOIDOSCOPY DIAGNOSTIC FLEXIBLE performed by Osbaldo Santos MD at [address] [facility] ENDOSCOPY       Immunization History: There is no immunization history on file for this patient. Active Problems:  Patient Active Problem List   Diagnosis Code    Acute febrile illness R50.9    Septicemia ([facility] [address] A41.9    Abdominal pain R10.9    Proctitis K62.89    Acute cystitis without hematuria N30.00    Abdominal pain, right upper quadrant R10.11    Transaminitis R74.01    Overweight (BMI 25.0-29. 9) E66.3       Isolation/Infection:   Isolation            No Isolation          Patient Infection Status       Infection Onset Added Last Indicated Last Indicated By Review Planned Expiration Resolved Resolved By    None active    Resolved    COVID-19 Rule Out 21 COVID-19 (Ordered)   21 Rule-Out Test Resulted            Nurse Assessment:  Last Vital Signs: BP (!) 135/91   Pulse 84   Temp 98.4 °F (36.9 °C) (Oral)   Resp 16   Ht 5' 5\" (1.651 m)   Wt 182 lb 6.4 oz (82.7 kg)   SpO2 95%   BMI 30.35 kg/m²     Last documented pain score (0-10 scale): Pain Level: 0  Last Weight:   Wt Readings from Last 1 Encounters:   05/28/21 182 lb 6.4 oz (82.7 kg)     Mental Status:  oriented    IV Access:  - PICC - site  L Basilic, insertion date: 05/26/21    Nursing Mobility/ADLs:  Walking   Independent  Transfer  Independent  Bathing  Independent  Dressing  Independent  [address] Delivery   prefers mixed with applesauce    Wound Care Documentation and Therapy:        Elimination:  Continence:   · Bowel: Yes  · Bladder: Yes  Urinary Catheter: None   Colostomy/Ileostomy/Ileal Conduit: No       Date of Last BM: 5/29/21    Intake/Output Summary (Last 24 hours) at 5/28/2021 1053  Last data filed at 5/28/2021 0901  Gross per 24 hour   Intake 400 ml   Output 1100 ml   Net -700 ml     I/O last 3 completed shifts:  In: -   Out: 1300 [Urine:1300]    Safety Concerns:     None    Impairments/Disabilities:      Vision and Hearing    Nutrition Therapy:  Current Nutrition Therapy:   - Oral Diet:  General    Routes of Feeding: Oral  Liquids: Thin Liquids  Daily Fluid Restriction: no  Last Modified Barium Swallow with Video (Video Swallowing Test): not done    Treatments at the Time of Hospital Discharge:   Respiratory Treatments:   Oxygen Therapy:  is not on home oxygen therapy.   Ventilator:    - No ventilator support    Rehab Therapies: Physical Therapy, Occupational Therapy and Speech/Language Therapy  Weight Bearing Status/Restrictions: No weight bearing restirctions  Other Medical Equipment (for information only, NOT a DME order):  PICC line with IV antibiotics  Other Treatments:     Patient's personal belongings (please select all that are sent with patient):  None    RN SIGNATURE:  Electronically signed by Joy Major RN on 5/29/21 at 2:50 PM EDT    CASE MANAGEMENT/SOCIAL WORK SECTION    Inpatient Status Date: 5/24/2021    Readmission Risk Assessment Score:  Readmission Risk              Risk of Unplanned Readmission:  7           Discharging to Facility/ Agency     · Name: [facility]   · Address:  · Phone:[phone]  · [address] [place] [address]  Phone: [phone]  Fax: [phone]      / signature: Tamara Becker RN, BSN  [phone]       PHYSICIAN SECTION    Prognosis: Good    Condition at Discharge: Stable    Rehab Potential (if transferring to Rehab): Good    Recommended Labs or Other Treatments After Discharge: PT, OT, RN, aide, weekly lab draws                            Out-patient antibiotic therapy (OPAT)/ Antibiotic Infusion orders          Diagnosis: Sepsis, unclear source, suspected bilateral pneumonia       Organism/ culture: See above     Name and dose of Antimicrobial: IV ertapenem 1 g every 24 hour     Antimicrobial start date: calculated from 5/28/2021     Antimicrobial completion date planned: 6/4/2021     Lab monitoring: CBC, Chem 12 once a week, to be       collected every Monday or Tuesday morning until the patient is off IV  antibiotics. Fax weekly lab results to Linnea Vallejo MD's office at        [phone]. Please maintain PICC line until the patient is on IV antibiotics. Ok to administer PICC line normal saline flushes as needed. ** Please notify Linnea Vallejo MD's office with any change in patient's        status, transfer out of facility or to hospital by calling [phone]           Outpatient Follow up: No outpatient ID f/u needed if continues to do well. Due to COVID 19 pandemic, if needed, a f/u video visit can be arranged via my office at patient's request on as-needed basis.            Physician Signature:  Electronically signed by Linnea Vallejo MD on 5/28/21 at 10:52 AM EDT       Physician Certification: I certify the above information and transfer of John Rausch  is necessary for the continuing treatment of the diagnosis listed and that she requires [address] for less 30 days.      Update Admission H&P: No change in H&P    PHYSICIAN SIGNATURE:  Electronically signed by BERTA Burgess CNP on 5/29/21 at 2:17 PM EDT

## 2021-05-28 NOTE — PROGRESS NOTES
pneumoniae negative  -ID checking procalcitonin and BNP  -Nasal MRSA probe pending  -Continue meropenem  -Pulmonology on board. Chest x-ray shows moderate size right pleural effusion, this will need to be monitored for resolution. Is been some concerns about aspiration due to persistent fever and bilateral infiltrates. P chest x-ray in the morning    Acute hypoxic respiratory failure  -Required supplemental O2, has been weaned to room air with sats 98%  - secondary to volume overload/pulmonary edema  -Treated respiratory status with diuresis, improved edema    Volume overload  -Continue Lasix 40 mg IV twice daily    Transaminitis  -Resolved    GERD  -Continue Protonix and Carafate    Anxiety  -Ativan IV as needed    Active Hospital Problems    Diagnosis     Receiving intravenous antibiotic treatment as outpatient [Z79.2]     Complex care coordination [Z71.89]     Weight loss counseling, encounter for [Z71.3]     Acute febrile illness [R50.9]     Septicemia ([facility] [address] [A41.9]     Abdominal pain [R10.9]     Proctitis [K62.89]     Acute cystitis without hematuria [N30.00]     Abdominal pain, right upper quadrant [R10.11]     Transaminitis [R74.01]     Overweight (BMI 25.0-29. 9) [E66.3]        Medications:  Reviewed    Infusion Medications    sodium chloride 25 mL (05/28/21 0024)     Scheduled Medications    ertapenem (INVanz) IVPB  1,000 mg Intravenous Q24H    guaiFENesin  600 mg Oral BID    furosemide  40 mg Intravenous BID    lactobacillus  1 capsule Oral BID WC    sucralfate  1 g Oral 4 times per day    lidocaine 1 % injection  5 mL Intradermal Once    sodium chloride flush  5-40 mL Intravenous 2 times per day    polyethylene glycol  17 g Oral BID    enoxaparin  40 mg Subcutaneous Daily    pantoprazole  40 mg Oral QAM AC    GI cocktail   Oral Once     PRN Meds: lip balm petroleum free, sodium chloride flush, sodium chloride, LORazepam, ondansetron, promethazine **OR** ondansetron, polyethylene glycol, acetaminophen **OR** acetaminophen, perflutren lipid microspheres, promethazine      Intake/Output Summary (Last 24 hours) at 5/28/2021 1641  Last data filed at 5/28/2021 0901  Gross per 24 hour   Intake 400 ml   Output 400 ml   Net 0 ml       Physical Exam Performed:    /84   Pulse 76   Temp 98.4 °F (36.9 °C) (Oral)   Resp 17   Ht 5' 5\" (1.651 m)   Wt 182 lb 6.4 oz (82.7 kg)   SpO2 95%   BMI 30.35 kg/m²     General appearance: No apparent distress, appears stated age and cooperative. HEENT: Pupils equal, round, and reactive to light. Conjunctivae/corneas clear. Neck: Supple, with full range of motion. No jugular venous distention. Trachea midline. Respiratory: Managed bilaterally   cardiovascular: Regular rate and rhythm with normal S1/S2 without murmurs, rubs or gallops. Abdomen: Soft, non-tender, non-distended with normal bowel sounds. Musculoskeletal: No clubbing, cyanosis or edema bilaterally. Full range of motion without deformity. Skin: Skin color, texture, turgor normal.  No rashes or lesions. Neurologic:  Neurovascularly intact without any focal sensory/motor deficits. Cranial nerves: II-XII intact, grossly non-focal.  Psychiatric: Alert and oriented, thought content appropriate, normal insight  Capillary Refill: Brisk,< 3 seconds   Peripheral Pulses: +2 palpable, equal bilaterally       Labs:   Recent Labs     05/26/21  0611 05/27/21  0637 05/28/21  0615   WBC 10.9 7.3 6.4   HGB 11.0* 9.6* 10.6*   HCT 32.9* 28.3* 31.3*    150 199     Recent Labs     05/26/21  0611 05/27/21  0637 05/28/21  0615   * 138 139   K 3.5 3.0* 3.2*    102 102   CO2 24 24 27   BUN 11 15 17   CREATININE 0.7 0.7 0.7   CALCIUM 8.6 8.3 8.5   PHOS 1.3* 1.3* 2.6     Recent Labs     05/26/21  0611 05/27/21  0637 05/28/21  0615   AST 37 23 29   ALT 51* 35 37   BILITOT 0.9 0.5 0.4   ALKPHOS 89 82 92     No results for input(s): INR in the last 72 hours.   No results for input(s): CKTOTAL, TROPONINI in the last 72 hours. Urinalysis:      Lab Results   Component Value Date    NITRU Negative 05/23/2021    WBCUA 11 05/23/2021    BACTERIA 2+ 05/23/2021    RBCUA 0-2 05/23/2021    BLOODU Negative 05/23/2021    SPECGRAV 1.016 05/23/2021    GLUCOSEU Negative 05/23/2021       Radiology:  XR CHEST PORTABLE   Final Result   Marked worsening in the appearance of the chest with development of a large   amount of bibasilar opacity thought to be due to bibasilar pneumonias and   small to moderate pleural effusions. No abnormal vascular congestion. XR ABDOMEN (KUB) (SINGLE AP VIEW)   Final Result      CT CHEST PULMONARY EMBOLISM W CONTRAST   Final Result   1. No acute pulmonary embolism to the segmental arteries. 2. Multifocal airspace disease. Correlate with presentation for pneumonia. Follow-up to resolution recommended. 3. Other findings as described. US GALLBLADDER RUQ   Final Result   Status post cholecystectomy. No significant biliary ductal dilatation      Heterogeneous echotexture throughout the liver, either technical or due to   diffuse hepatocellular disease      Small right-sided pleural effusion         US NON OB TRANSVAGINAL   Final Result   Poor visualization of the ovaries. Nodular density in the right adnexa is   seen, difficult to determine as to whether not this represents right ovary or   large fibroid. Small amount of free fluid in pelvis. XR ABDOMEN (KUB) (SINGLE AP VIEW)   Final Result   Unremarkable abdomen. CT ABDOMEN PELVIS W IV CONTRAST Additional Contrast? None   Final Result   1. Wall thickening of the rectum with surrounding stranding. Infection and   inflammation are in the differential.   2. Lobulated enlarged uterus may be due to fibroids. Ovaries are not well   visualized. Ultrasound or MRI would better evaluate. 3. Other findings as described.          XR CHEST PORTABLE   Final Result   Negative portable chest. DVT Prophylaxis: Lovenox  Diet: DIET GENERAL;  Code Status: Full Code    PT/OT Eval Status: No acute needs    Dispo -Home with home health care once medically stable    Ronaldo Deleon, APRN - CNP      NOTE:  This report was transcribed using voice recognition software. Every effort was made to ensure accuracy; however, inadvertent computerized transcription errors may be present.

## 2021-05-28 NOTE — CARE COORDINATION
Referral received to check IV Benefits.  Patients benefit information is as follows:       Patient is covered at 100%  [employer] accepted the referral    Electronically signed by Clark Burton on 5/28/2021 at 11:48 AM   Cell Ph# [phone], Office # [phone]

## 2021-05-28 NOTE — PROGRESS NOTES
tachypnea--this is resolved, responding to Carbapenem's  · CT abdomen pelvis concerning for proctitis-s/p diagnostic  sigmoidoscopy on 5/25/2021 - GI not convinced that she has proctitis  · Urinary tract infection-urine culture remain negative  · Transaminitis-resolved  · Overweight due to excess calorie intake : Body mass index is 25.79 kg/m².-Counseling done      Discussion:      I had switched patient from IV meropenem to IV ertapenem yesterday. She remains afebrile. White cell count continues to improve on ertapenem and is 6400 today. Urine Legionella and pneumococcal antigens were negative. Respiratory viral PCR panel was also negative. Serum creatinine is 0.7. Blood cultures from 5/24/2021 also remain negative    Portable chest x-ray from today reviewed. Shows bilateral infiltrates versus atelectasis. Although, patient's blood cultures have been negative, she was clearly severely septic on presentation and was feeling despite ciprofloxacin and Flagyl and did respond to Carabepenems, hence, will plan for an empiric total of 10-day course of carbapenems. She already has a midline placed in the left    Plan:     Diagnostic Workup:    · Follow-up on nasal MRSA probe  · Continue to follow  fever curve, WBC count and blood cultures. · Continue to monitor blood counts, liver and renal function. Antimicrobials:    · Will continue IV ertapenem 1 g every 24 hour  · Will plan to keep her on ertapenem for 1 more week  · Recommend probiotic twice daily while on ertapenem  · Diuresis per primary and pulmonary  · Oxygen support to maintain oxygen saturation above 92%  · We will follow up on the culture results and clinical progress and will make further recommendations accordingly. · Continue close vitals monitoring. · Maintain good glycemic control. · Fall precautions. Aspiration precautions. · Continue to watch for new fever or diarrhea. · DVT prophylaxis.   · Discussed all above with patient and things to watch for like new rashes, lip swelling, severe reaction, worsening diarrhea, break through fever etc.  · Discussed patient's condition and what to expect. All of the patient's questions were addressed in a satisfactory manner and patient verbalized understanding all instructions. Weight loss counseling:    Extensive weight loss counseling was done. It is important to set a realistic weight loss goal. First goal should be to avoid gaining more weight and staying at current weight (or within 5 percent). People at high risk of developing diabetes who are able to lose 5 percent of their body weight and maintain this weight will reduce their risk of developing diabetes by about 50 percent and reduce their blood pressure. Losing more than 15 percent of  body weight and staying at this weight is an extremely good result, even if you never reach your \"dream\" or \"ideal\" weight. Lifestyle changes including changing eating habits, substituting excess carbohydrates with proteins, stress reduction, using self-help programs like Weight Watchers®, Overeaters Anonymous®, and Take Off Pounds Sensibly (TOPS)© , following DASH diet and increasing exercise or walking briskly daily for half hour to and hour 5-7 days a week was suggested among other measures. Information was given about various weight loss education programs and their websites like www.cdc.gov/healthyweight, www.choosemyplate.gov and www.health.gov/dietaryguidelines/    Level of complexity of visit: High     TIME SPENT TODAY:     - Spent over  36 minutes on visit (including interval history, physical exam, review of data including labs, cultures, imaging, development and implementation of treatment plan and coordination of complex care). More than 50 percent of this includes face-to-face time spent with the patient for counseling and coordination of care. Thanks for allowing me to participate in your patient's care.  I will sign off today, but will be All family history was reviewed today. Family history unknown: Yes       Objective:       PHYSICAL EXAM:      Vitals:   Vitals:    05/28/21 0353 05/28/21 0529 05/28/21 0810 05/28/21 0845   BP:    (!) 135/91   Pulse:    84   Resp: 17   16   Temp:    98.4 °F (36.9 °C)   TempSrc:    Oral   SpO2: 91%  97% 95%   Weight:  182 lb 6.4 oz (82.7 kg)     Height:           Physical Exam         Lines: All vascular access sites are healthy with no local erythema, discharge or tenderness. Intake and output:    I/O last 3 completed shifts:  In: -   Out: 1300 [Urine:1300]    Lab Data:   All available labs and old records have been reviewed by me. CBC:  Recent Labs     05/26/21  0611 05/27/21  0637 05/28/21  0615   WBC 10.9 7.3 6.4   RBC 3.87* 3.36* 3.68*   HGB 11.0* 9.6* 10.6*   HCT 32.9* 28.3* 31.3*    150 199   MCV 84.9 84.1 85.0   MCH 28.5 28.6 28.8   MCHC 33.5 34.0 33.8   RDW 14.3 14.5 14.3        BMP:  Recent Labs     05/26/21  0611 05/27/21  0637 05/28/21  0615   * 138 139   K 3.5 3.0* 3.2*    102 102   CO2 24 24 27   BUN 11 15 17   CREATININE 0.7 0.7 0.7   CALCIUM 8.6 8.3 8.5   GLUCOSE 115* 97 103*        Hepatic Function Panel:   Lab Results   Component Value Date    ALKPHOS 92 05/28/2021    ALT 37 05/28/2021    AST 29 05/28/2021    PROT 6.1 05/28/2021    BILITOT 0.4 05/28/2021    LABALBU 3.2 05/28/2021       CPK: No results found for: CKTOTAL  ESR: No results found for: SEDRATE  CRP: No results found for: CRP        Imaging: All pertinent images and reports for the current visit were reviewed by me during this visit. XR CHEST PORTABLE   Final Result   Marked worsening in the appearance of the chest with development of a large   amount of bibasilar opacity thought to be due to bibasilar pneumonias and   small to moderate pleural effusions. No abnormal vascular congestion.          XR ABDOMEN (KUB) (SINGLE AP VIEW)   Final Result      CT CHEST PULMONARY EMBOLISM W CONTRAST   Final Result 1. No acute pulmonary embolism to the segmental arteries. 2. Multifocal airspace disease. Correlate with presentation for pneumonia. Follow-up to resolution recommended. 3. Other findings as described. US GALLBLADDER RUQ   Final Result   Status post cholecystectomy. No significant biliary ductal dilatation      Heterogeneous echotexture throughout the liver, either technical or due to   diffuse hepatocellular disease      Small right-sided pleural effusion         US NON OB TRANSVAGINAL   Final Result   Poor visualization of the ovaries. Nodular density in the right adnexa is   seen, difficult to determine as to whether not this represents right ovary or   large fibroid. Small amount of free fluid in pelvis. XR ABDOMEN (KUB) (SINGLE AP VIEW)   Final Result   Unremarkable abdomen. CT ABDOMEN PELVIS W IV CONTRAST Additional Contrast? None   Final Result   1. Wall thickening of the rectum with surrounding stranding. Infection and   inflammation are in the differential.   2. Lobulated enlarged uterus may be due to fibroids. Ovaries are not well   visualized. Ultrasound or MRI would better evaluate. 3. Other findings as described. XR CHEST PORTABLE   Final Result   Negative portable chest.             Medications: All current and past medications were reviewed.      ertapenem (INVanz) IVPB  1,000 mg Intravenous Q24H    guaiFENesin  600 mg Oral BID    furosemide  40 mg Intravenous BID    lactobacillus  1 capsule Oral BID WC    sucralfate  1 g Oral 4 times per day    lidocaine 1 % injection  5 mL Intradermal Once    sodium chloride flush  5-40 mL Intravenous 2 times per day    polyethylene glycol  17 g Oral BID    enoxaparin  40 mg Subcutaneous Daily    pantoprazole  40 mg Oral QAM AC    GI cocktail   Oral Once        sodium chloride 25 mL (05/28/21 0024)       lip balm petroleum free, sodium chloride flush, sodium chloride, LORazepam, ondansetron, promethazine **OR** ondansetron, polyethylene glycol, acetaminophen **OR** acetaminophen, perflutren lipid microspheres, promethazine      Problem list:       Patient Active Problem List   Diagnosis Code    Acute febrile illness R50.9    Septicemia ([facility] [address] A41.9    Abdominal pain R10.9    Proctitis K62.89    Acute cystitis without hematuria N30.00    Abdominal pain, right upper quadrant R10.11    Transaminitis R74.01    Overweight (BMI 25.0-29. 9) E66.3       Please note that this chart was generated using Dragon dictation software. Although every effort was made to ensure the accuracy of this automated transcription, some errors in transcription may have occurred inadvertently. If you may need any clarification, please do not hesitate to contact me through EPIC or at the phone number provided below with my electronic signature. Any pictures or media included in this note were obtained after taking informed verbal consent from the patient and with their approval to include those in the patient's medical record.     Anibal Mitchell MD, MPH  5/28/2021 , 10:49 AM   [facility]   [address] [place] [address], [address] [place] [address]  Office: [phone]  Fax: [phone]  Clinic days:  Tuesday & Thursday

## 2021-05-29 ENCOUNTER — APPOINTMENT (OUTPATIENT)
Dept: GENERAL RADIOLOGY | Age: 46
DRG: 871 | End: 2021-05-29
Payer: COMMERCIAL

## 2021-05-29 VITALS
WEIGHT: 184.6 LBS | HEART RATE: 82 BPM | SYSTOLIC BLOOD PRESSURE: 122 MMHG | TEMPERATURE: 98.5 F | RESPIRATION RATE: 16 BRPM | HEIGHT: 65 IN | OXYGEN SATURATION: 97 % | DIASTOLIC BLOOD PRESSURE: 79 MMHG | BODY MASS INDEX: 30.75 KG/M2

## 2021-05-29 LAB
A/G RATIO: 1.2 (ref 1.1–2.2)
ALBUMIN SERPL-MCNC: 3.3 G/DL (ref 3.4–5)
ALP BLD-CCNC: 88 U/L (ref 40–129)
ALT SERPL-CCNC: 40 U/L (ref 10–40)
ANION GAP SERPL CALCULATED.3IONS-SCNC: 10 MMOL/L (ref 3–16)
AST SERPL-CCNC: 39 U/L (ref 15–37)
BASOPHILS ABSOLUTE: 0 K/UL (ref 0–0.2)
BASOPHILS RELATIVE PERCENT: 0.7 %
BILIRUB SERPL-MCNC: 0.4 MG/DL (ref 0–1)
BUN BLDV-MCNC: 14 MG/DL (ref 7–20)
CALCIUM SERPL-MCNC: 8.8 MG/DL (ref 8.3–10.6)
CHLORIDE BLD-SCNC: 101 MMOL/L (ref 99–110)
CO2: 28 MMOL/L (ref 21–32)
CREAT SERPL-MCNC: 0.6 MG/DL (ref 0.6–1.1)
EOSINOPHILS ABSOLUTE: 0.2 K/UL (ref 0–0.6)
EOSINOPHILS RELATIVE PERCENT: 2.1 %
GFR AFRICAN AMERICAN: >60
GFR NON-AFRICAN AMERICAN: >60
GLOBULIN: 2.8 G/DL
GLUCOSE BLD-MCNC: 105 MG/DL (ref 70–99)
HCT VFR BLD CALC: 32 % (ref 36–48)
HEMOGLOBIN: 10.8 G/DL (ref 12–16)
LYMPHOCYTES ABSOLUTE: 1.2 K/UL (ref 1–5.1)
LYMPHOCYTES RELATIVE PERCENT: 16.6 %
MCH RBC QN AUTO: 28.6 PG (ref 26–34)
MCHC RBC AUTO-ENTMCNC: 33.8 G/DL (ref 31–36)
MCV RBC AUTO: 84.6 FL (ref 80–100)
MONOCYTES ABSOLUTE: 0.6 K/UL (ref 0–1.3)
MONOCYTES RELATIVE PERCENT: 8.3 %
NEUTROPHILS ABSOLUTE: 5.1 K/UL (ref 1.7–7.7)
NEUTROPHILS RELATIVE PERCENT: 72.3 %
PDW BLD-RTO: 14.4 % (ref 12.4–15.4)
PHOSPHORUS: 3.5 MG/DL (ref 2.5–4.9)
PLATELET # BLD: 265 K/UL (ref 135–450)
PMV BLD AUTO: 8.1 FL (ref 5–10.5)
POTASSIUM SERPL-SCNC: 3.5 MMOL/L (ref 3.5–5.1)
RBC # BLD: 3.78 M/UL (ref 4–5.2)
SODIUM BLD-SCNC: 139 MMOL/L (ref 136–145)
TOTAL PROTEIN: 6.1 G/DL (ref 6.4–8.2)
WBC # BLD: 7.1 K/UL (ref 4–11)

## 2021-05-29 PROCEDURE — 85025 COMPLETE CBC W/AUTO DIFF WBC: CPT

## 2021-05-29 PROCEDURE — 6370000000 HC RX 637 (ALT 250 FOR IP): Performed by: INTERNAL MEDICINE

## 2021-05-29 PROCEDURE — 6360000002 HC RX W HCPCS: Performed by: INTERNAL MEDICINE

## 2021-05-29 PROCEDURE — 2580000003 HC RX 258: Performed by: INTERNAL MEDICINE

## 2021-05-29 PROCEDURE — 71045 X-RAY EXAM CHEST 1 VIEW: CPT

## 2021-05-29 PROCEDURE — 94761 N-INVAS EAR/PLS OXIMETRY MLT: CPT

## 2021-05-29 PROCEDURE — 84100 ASSAY OF PHOSPHORUS: CPT

## 2021-05-29 PROCEDURE — 99233 SBSQ HOSP IP/OBS HIGH 50: CPT | Performed by: INTERNAL MEDICINE

## 2021-05-29 PROCEDURE — 80053 COMPREHEN METABOLIC PANEL: CPT

## 2021-05-29 RX ORDER — PANTOPRAZOLE SODIUM 40 MG/1
40 TABLET, DELAYED RELEASE ORAL
Qty: 30 TABLET | Refills: 0 | Status: SHIPPED | OUTPATIENT
Start: 2021-05-30

## 2021-05-29 RX ORDER — LACTOBACILLUS RHAMNOSUS GG 10B CELL
1 CAPSULE ORAL 2 TIMES DAILY WITH MEALS
Qty: 28 CAPSULE | Refills: 0 | Status: SHIPPED | OUTPATIENT
Start: 2021-05-29 | End: 2021-06-10

## 2021-05-29 RX ORDER — POTASSIUM CHLORIDE 20 MEQ/1
20 TABLET, EXTENDED RELEASE ORAL DAILY
Qty: 5 TABLET | Refills: 0 | Status: SHIPPED | OUTPATIENT
Start: 2021-05-29 | End: 2021-06-10

## 2021-05-29 RX ORDER — GUAIFENESIN 600 MG/1
600 TABLET, EXTENDED RELEASE ORAL 2 TIMES DAILY
Qty: 14 TABLET | Refills: 0 | Status: SHIPPED | OUTPATIENT
Start: 2021-05-29 | End: 2021-06-05

## 2021-05-29 RX ORDER — ONDANSETRON 4 MG/1
4 TABLET, ORALLY DISINTEGRATING ORAL EVERY 8 HOURS PRN
Qty: 30 TABLET | Refills: 0 | Status: SHIPPED | OUTPATIENT
Start: 2021-05-29 | End: 2021-06-10

## 2021-05-29 RX ORDER — SUCRALFATE 1 G/1
1 TABLET ORAL 4 TIMES DAILY
Qty: 120 TABLET | Refills: 0 | Status: SHIPPED | OUTPATIENT
Start: 2021-05-29

## 2021-05-29 RX ORDER — FUROSEMIDE 40 MG/1
40 TABLET ORAL DAILY
Qty: 5 TABLET | Refills: 0 | Status: SHIPPED | OUTPATIENT
Start: 2021-05-29 | End: 2021-06-10

## 2021-05-29 RX ORDER — POLYETHYLENE GLYCOL 3350 17 G/17G
17 POWDER, FOR SOLUTION ORAL 2 TIMES DAILY
Qty: 527 G | Refills: 0 | Status: SHIPPED | OUTPATIENT
Start: 2021-05-29 | End: 2021-06-28

## 2021-05-29 RX ADMIN — GUAIFENESIN 600 MG: 600 TABLET, EXTENDED RELEASE ORAL at 08:09

## 2021-05-29 RX ADMIN — FUROSEMIDE 40 MG: 10 INJECTION, SOLUTION INTRAMUSCULAR; INTRAVENOUS at 08:09

## 2021-05-29 RX ADMIN — Medication 1 CAPSULE: at 08:09

## 2021-05-29 RX ADMIN — ERTAPENEM SODIUM 1000 MG: 1 INJECTION, POWDER, LYOPHILIZED, FOR SOLUTION INTRAMUSCULAR; INTRAVENOUS at 11:15

## 2021-05-29 RX ADMIN — ENOXAPARIN SODIUM 40 MG: 40 INJECTION SUBCUTANEOUS at 08:08

## 2021-05-29 RX ADMIN — Medication 10 ML: at 08:09

## 2021-05-29 RX ADMIN — SUCRALFATE 1 G: 1 TABLET ORAL at 12:29

## 2021-05-29 RX ADMIN — ACETAMINOPHEN 650 MG: 325 TABLET ORAL at 06:43

## 2021-05-29 RX ADMIN — ONDANSETRON 4 MG: 2 INJECTION INTRAMUSCULAR; INTRAVENOUS at 06:50

## 2021-05-29 RX ADMIN — SUCRALFATE 1 G: 1 TABLET ORAL at 00:03

## 2021-05-29 RX ADMIN — SUCRALFATE 1 G: 1 TABLET ORAL at 05:07

## 2021-05-29 RX ADMIN — SODIUM CHLORIDE 25 ML: 9 INJECTION, SOLUTION INTRAVENOUS at 11:13

## 2021-05-29 RX ADMIN — PANTOPRAZOLE SODIUM 40 MG: 40 TABLET, DELAYED RELEASE ORAL at 05:07

## 2021-05-29 ASSESSMENT — PAIN DESCRIPTION - PAIN TYPE: TYPE: ACUTE PAIN

## 2021-05-29 ASSESSMENT — PAIN DESCRIPTION - LOCATION: LOCATION: HEAD

## 2021-05-29 ASSESSMENT — PAIN SCALES - GENERAL
PAINLEVEL_OUTOF10: 2
PAINLEVEL_OUTOF10: 2

## 2021-05-29 NOTE — PROGRESS NOTES
[facility] Pulmonary/CCM Progress note      Admit Date: 5/24/2021    Chief Complaint: Abdominal pain, nausea and vomiting    Subjective: Interval History: Patient feels that her dyspnea is better, edema is better. Currently on room air. Dry cough only.     Scheduled Meds:   ertapenem (INVanz) IVPB  1,000 mg Intravenous Q24H    guaiFENesin  600 mg Oral BID    furosemide  40 mg Intravenous BID    lactobacillus  1 capsule Oral BID WC    sucralfate  1 g Oral 4 times per day    lidocaine 1 % injection  5 mL Intradermal Once    sodium chloride flush  5-40 mL Intravenous 2 times per day    polyethylene glycol  17 g Oral BID    enoxaparin  40 mg Subcutaneous Daily    pantoprazole  40 mg Oral QAM AC    GI cocktail   Oral Once     Continuous Infusions:   sodium chloride 25 mL (05/28/21 0024)     PRN Meds:lip balm petroleum free, sodium chloride flush, sodium chloride, LORazepam, ondansetron, promethazine **OR** ondansetron, polyethylene glycol, acetaminophen **OR** acetaminophen, perflutren lipid microspheres, promethazine    Review of Systems  Constitutional: negative for fatigue, fevers, malaise and weight loss  Ears, nose, mouth, throat: negative for ear drainage, epistaxis, hoarseness, nasal congestion, sore throat and voice change  Respiratory: negative except for dyspnea with exertion and cough  Cardiovascular: negative for chest pain, chest pressure/discomfort, irregular heart beat, lower extremity edema and palpitations  Gastrointestinal: negative for abdominal pain, constipation, diarrhea, jaundice, melena, odynophagia, reflux symptoms and vomiting  Hematologic/lymphatic: negative for bleeding, easy bruising, lymphadenopathy and petechiae  Musculoskeletal:negative for arthralgias, bone pain, muscle weakness, neck pain and stiff joints  Neurological: negative for dizziness, gait problems, headaches, seizures, speech problems, tremors and weakness  Behavioral/Psych: negative for anxiety, behavior problems, depression, fatigue and sleep disturbance  Endocrine: negative for diabetic symptoms including none, neuropathy, polyphagia, polyuria, polydipsia, vomiting and diarrhea and temperature intolerance  Allergic/Immunologic: negative for anaphylaxis, angioedema, hay fever and urticaria    Objective:     Patient Vitals for the past 8 hrs:   BP Temp Temp src Pulse Resp SpO2   05/28/21 1629 132/84 98.4 °F (36.9 °C) Oral 76 17 95 %     I/O last 3 completed shifts: In: 400 [P.O.:400]  Out: 400 [Urine:400]  No intake/output data recorded.     General Appearance: alert and oriented to person, place and time, well developed and well- nourished, in no acute distress  Skin: warm and dry, no rash or erythema  Head: normocephalic and atraumatic  Eyes: pupils equal, round, and reactive to light, extraocular eye movements intact, conjunctivae normal  ENT: external ear and ear canal normal bilaterally, nose without deformity, nasal mucosa and turbinates normal  Neck: supple and non-tender without mass, no cervical lymphadenopathy  Pulmonary/Chest: decreased breath sounds noted-both bases, bilateral crackles  Cardiovascular: normal rate, regular rhythm,  no murmurs, rubs, distal pulses intact, no carotid bruits  Abdomen: soft, non-tender, non-distended, normal bowel sounds, no masses or organomegaly  Lymph Nodes: Cervical, supraclavicular normal  Extremities: no cyanosis, clubbing or edema  Musculoskeletal: normal range of motion, no joint swelling, deformity or tenderness  Neurologic: alert, no focal neurologic deficits    Data Review:  CBC:   Lab Results   Component Value Date    WBC 6.4 05/28/2021    RBC 3.68 05/28/2021     BMP:   Lab Results   Component Value Date    GLUCOSE 103 05/28/2021    CO2 27 05/28/2021    BUN 17 05/28/2021    CREATININE 0.7 05/28/2021    CALCIUM 8.5 05/28/2021     ABG: No results found for: VQP6CFS, BEART, X0EFVNEW, PHART, THGBART, TFV4ZNB, PO2ART, YXI1XQP    Radiology: All pertinent images / reports were

## 2021-05-29 NOTE — DISCHARGE SUMMARY
Hospital Medicine Discharge Summary    Patient ID: Kim Burton      Patient's PCP: Rosalie Fletcher DO    Admit Date: 5/24/2021     Discharge Date:   5/29/2021    Admitting Physician: Jcarlos Diana MD     Discharge Physician: BERTA Schuler - CNP     Discharge Diagnoses  Sepsis  Proctitis  Possible UTI  Bilateral pleural effusion  Acute hypoxic respiratory failure  Volume overload  Transaminitis  GERD  Anxiety      Hospital Course: 53 yo F with history of recent hemorrhoid banding who came to ER with fevers.  Had gone to [facility] ED and sent home.  Admitted as inpatient for fever of unclear origin with sepsis.  Started on IV Cipro and Flagyl for proctitis.  GI and ID consulted. Antonio Brace pending. [facility] colonoscopy on 5/25 Findings:   Colon mucosa was normal. There was large amounts of stool in rectum and colon, c/w fecal impaction. Enlarged internal hemorrhoids. s/p digital disimpaction    Sepsis  -Criteria met: Fever, leukocytosis, tachycardia, tachypnea  -Source proctitis, Bibasilar multifocal pneumonia  -Continue IV antibiotics  -Sepsis resolved  -Echo without vegetation  -Blood cultures no growth to date  -Sepsis did not respond to Flagyl and Cipro, but did respond to Carbapenem's.   ID plans for empiric total 10-day course of Carbapenem's.     Proctitis  -CT abdomen pelvis concerning for proctitis  -Patient had recent hemorrhoid surgery and has struggled with fecal impaction since.   -Infectious disease on case  -Switched to ertapenem 1 g every 24 hour, home IV antibiotics arranged  -Lactobacillus twice daily while on meropenem, prescription provided  -Patient has a PICC line  -GI consulted, bowel regimen recommended  -Patient to follow-up with Dr. David Grajeda in clinic for further hemorrhoidal banding and colonoscopy  -Continue MiraLAX twice daily     UTI  -Culture negative thus far     Bilateral pleural effusion  -Respiratory viral panel ordered, negative  -Urine Legionella and strep pneumoniae negative  -Nasal MRSA probe pending  -Continue Invanz  -Pulmonology on board. Chest x-ray shows moderate size right pleural effusion, this will need to be monitored for resolution.   -Discussed with pulmonology, okay for discharge home.     Acute hypoxic respiratory failure  -Required supplemental O2, has been weaned to room air with sats 98%  - secondary to volume overload/pulmonary edema  -Treated respiratory status with diuresis, improved edema     Volume overload  -Treated with IV Lasix  -Discussed with pulmonology, recommend Lasix for 5 days. Lasix 40 mg p.o. daily for 5 days prescribed at discharge with Klor-Con 20 mEq p.o. daily while on Lasix  -Weekly labs while on antibiotics     Transaminitis  -Resolved     GERD  -Continue Protonix and Carafate     Anxiety  -Ativan IV as needed      Patient stated they felt much improved and wanted to go home. Patient does have an occasional cough that makes her short of breath briefly, she was supposed quickly. No further hypoxia. Patient denied chest pain, shortness of breath, palpitations, abdominal pain, nausea vomiting, diarrhea, dysuria, headache lightheadedness or dizziness. Appetite good. Voiding without difficulty. Reviewed plan of care with patient, verbalized understanding and agreement. Denied further questions or needs. Physical Exam Performed:     /79   Pulse 82   Temp 98.5 °F (36.9 °C) (Oral)   Resp 16   Ht 5' 5\" (1.651 m)   Wt 184 lb 9.6 oz (83.7 kg)   SpO2 97%   BMI 30.72 kg/m²       General appearance: No apparent distress, appears stated age and cooperative. HEENT: Pupils equal, round, and reactive to light. Conjunctivae/corneas clear. Neck: Supple, with full range of motion. No jugular venous distention. Trachea midline. Respiratory: diminshed bilaterally   cardiovascular: Regular rate and rhythm with normal S1/S2 without murmurs, rubs or gallops.   Abdomen: Soft, non-tender, non-distended with normal bowel hepatocellular disease      Small right-sided pleural effusion         US NON OB TRANSVAGINAL   Final Result   Poor visualization of the ovaries. Nodular density in the right adnexa is   seen, difficult to determine as to whether not this represents right ovary or   large fibroid. Small amount of free fluid in pelvis. XR ABDOMEN (KUB) (SINGLE AP VIEW)   Final Result   Unremarkable abdomen. CT ABDOMEN PELVIS W IV CONTRAST Additional Contrast? None   Final Result   1. Wall thickening of the rectum with surrounding stranding. Infection and   inflammation are in the differential.   2. Lobulated enlarged uterus may be due to fibroids. Ovaries are not well   visualized. Ultrasound or MRI would better evaluate. 3. Other findings as described. XR CHEST PORTABLE   Final Result   Negative portable chest.                Consults:     IP CONSULT TO HOSPITALIST  IP CONSULT TO GI  IP CONSULT TO INFECTIOUS DISEASES  IP CONSULT TO OB GYN  IP CONSULT TO PULMONOLOGY  IP CONSULT TO PHARMACY  IP CONSULT TO HOME CARE NEEDS    Disposition: Home with home health care    Condition at Discharge: Stable    Discharge Instructions/Follow-up:      Follow-up with PCP in 1 week  Follow-up with pulmonology as directed for monitoring of infiltrate/effusions in the lungs  Follow-up with Dr. Shazia Alvarez in clinic for further hemorrhoidal banding and colonoscopy.       Bowel regimen  -GI recommends MiraLAX twice a day if you are persistently constipated  -Over-the-counter Benefiber or Metamucil daily to help with constipation  -May take Colace 200 mg daily or twice a day as needed for constipation, this is over-the-counter  -May take senna 1-2 tabs once or twice a day for constipation, this is over-the-counter  -Encourage drinking a glass of warm prune juice daily, eating dried prunes as a snack  -Avoid stool becoming formed due to internal hemorrhoids    PICC line care per home health care nurse  IV antibiotic Invanz per home health care nurse      Code Status:  Full Code     Activity: activity as tolerated    Diet: regular diet      Discharge Medications:     Current Discharge Medication List           Details   lactobacillus (CULTURELLE) capsule Take 1 capsule by mouth 2 times daily (with meals) for 14 days  Qty: 28 capsule, Refills: 0      ondansetron (ZOFRAN-ODT) 4 MG disintegrating tablet Take 1 tablet by mouth every 8 hours as needed for Nausea or Vomiting  Qty: 30 tablet, Refills: 0      guaiFENesin (MUCINEX) 600 MG extended release tablet Take 1 tablet by mouth 2 times daily for 7 days  Qty: 14 tablet, Refills: 0      furosemide (LASIX) 40 MG tablet Take 1 tablet by mouth daily for 5 days  Qty: 5 tablet, Refills: 0      polyethylene glycol (GLYCOLAX) 17 g packet Take 17 g by mouth 2 times daily  Qty: 527 g, Refills: 0      pantoprazole (PROTONIX) 40 MG tablet Take 1 tablet by mouth every morning (before breakfast)  Qty: 30 tablet, Refills: 0      sucralfate (CARAFATE) 1 GM tablet Take 1 tablet by mouth 4 times daily  Qty: 120 tablet, Refills: 0      potassium chloride (KLOR-CON M) 20 MEQ extended release tablet Take 1 tablet by mouth daily for 5 days  Qty: 5 tablet, Refills: 0              Details   clonazePAM (KLONOPIN) 0.125 MG disintegrating tablet Take 0.125 mg by mouth nightly as needed. Time Spent on discharge is more than 30 minutes in the examination, evaluation, counseling and review of medications and discharge plan. Signed: BERTA Estevez CNP   5/29/2021    The patient was seen and examined on day of discharge and this discharge summary is in conjunction with any daily progress note from day of discharge. Thank you Jerry Rich DO for the opportunity to be involved in this patient's care. If you have any questions or concerns please feel free to contact me at [phone]. NOTE:  This report was transcribed using voice recognition software.   Every effort was made to ensure accuracy; however, inadvertent computerized transcription errors may be present.

## 2021-05-29 NOTE — FLOWSHEET NOTE
Patient's head to toe assessment complete at this time. Routine VSS. Pt is awake, alert, and oriented. Pt up to chair, resting comfortably with respirations even and unlabored. All nightly medications given per MAR. PRN Tylenol given for complaints of 2/10 generalized pain. PRN ativan given per pt request for anxiety. Pts mother at bedside. Pt independent in room. No other needs expressed at this time, call light within reach. Will continue to monitor.         05/28/21 2106   Vital Signs   Temp 98.3 °F (36.8 °C)   Temp Source Oral   Pulse 78   Heart Rate Source Monitor   Resp 18   /73   BP Location Left upper arm   MAP (mmHg) 87   Patient Position Sitting   Level of Consciousness Alert (0)   MEWS Score 1   Patient Currently in Pain No   Pain Assessment   Pain Assessment 0-10   Pain Level 2   Patient's Stated Pain Goal No pain   Pain Type Acute pain   Oxygen Therapy   SpO2 96 %   Pulse Oximeter Device Mode Intermittent   Pulse Oximeter Device Location Finger   O2 Device None (Room air)   O2 Flow Rate (L/min) 0 L/min

## 2021-05-29 NOTE — CARE COORDINATION
Discharge Note:    IV antibiotics will be delivered by:    N[facility]  Phone: [phone]  Fax: [phone]      [facility] services will be provided by:    [facility]  Phone: [phone]  Fax: [phone]      No further case management needs.     Rick Flores RN BSN  Case Management  [phone]

## 2021-05-29 NOTE — PROGRESS NOTES
weakness  Behavioral/Psych: negative for anxiety, behavior problems, depression, fatigue and sleep disturbance  Endocrine: negative for diabetic symptoms including none, neuropathy, polyphagia, polyuria, polydipsia, vomiting and diarrhea and temperature intolerance  Allergic/Immunologic: negative for anaphylaxis, angioedema, hay fever and urticaria    Objective:     Patient Vitals for the past 8 hrs:   BP Temp Temp src Pulse Resp SpO2   05/29/21 1146 122/79 98.5 °F (36.9 °C) Oral 82 16 97 %   05/29/21 0944 -- -- -- -- 18 97 %   05/29/21 0800 129/83 98.2 °F (36.8 °C) Oral 81 18 98 %     I/O last 3 completed shifts: In: 360 [P.O.:360]  Out: -   No intake/output data recorded.     General Appearance: alert and oriented to person, place and time, well developed and well- nourished, in no acute distress  Skin: warm and dry, no rash or erythema  Head: normocephalic and atraumatic  Eyes: pupils equal, round, and reactive to light, extraocular eye movements intact, conjunctivae normal  ENT: external ear and ear canal normal bilaterally, nose without deformity, nasal mucosa and turbinates normal  Neck: supple and non-tender without mass, no cervical lymphadenopathy  Pulmonary/Chest: decreased breath sounds noted-both bases, bilateral crackles  Cardiovascular: normal rate, regular rhythm,  no murmurs, rubs, distal pulses intact, no carotid bruits  Abdomen: soft, non-tender, non-distended, normal bowel sounds, no masses or organomegaly  Lymph Nodes: Cervical, supraclavicular normal  Extremities: no cyanosis, clubbing or edema  Musculoskeletal: normal range of motion, no joint swelling, deformity or tenderness  Neurologic: alert, no focal neurologic deficits    Data Review:  LABS:  Recent Labs     05/27/21  0637 05/28/21  0615 05/29/21  0512   WBC 7.3 6.4 7.1   HGB 9.6* 10.6* 10.8*   HCT 28.3* 31.3* 32.0*    199 265   ALT 35 37 40   AST 23 29 39*    139 139   K 3.0* 3.2* 3.5    102 101   CREATININE 0.7 0.7 failure, resolved  Pulmonary edema, resolving  Bilateral pleural effusion    Assessment/Plan:     -Acute hypoxic respiratory failure related to volume overload/pulmonary edema. Patient also noted to have small to moderate bilateral pleural effusions. Chest imaging done today shows stable findings.  -Encouraged patient to perform incentive spirometry every hour.  -Continue with p.o. Lasix 40 mg twice a day for another 5 to 7 days.  -Fevers most likely due to postop atelectasis. Fevers are downtrending now. -She is currently on Invanz as per ID recommendations.  -From pulmonary standpoint she can be discharged back home to follow-up with pulmonary office within 7 days.     We will sign off    Madelyn Will MD

## 2021-05-29 NOTE — PROGRESS NOTES
Hospitalist Progress Note      PCP: Sidra Wilson DO    Date of Admission: 5/24/2021    Chief Complaint: Fever    Hospital Course: 55 yo F with history of recent hemorrhoid banding who came to ER with fevers. Had gone to [facility] ED and sent home. Admitted as inpatient for fever of unclear origin with sepsis. Started on IV Cipro and Flagyl for proctitis. GI and ID consulted. Echo pending. Underwent colonoscopy on 5/25 Findings:   Colon mucosa was normal. There was large amounts of stool in rectum and colon, c/w fecal impaction. Enlarged internal hemorrhoids. s/p digital disimpaction    Subjective: Patient sitting up in chair on room air. States she feels much improved and hopes to go home tomorrow. Patient denied chest pain, shortness of breath, palpitations, abdominal pain, nausea vomiting,  dysuria, headache lightheadedness or dizziness. Appetite good. Voiding without difficulty. Reviewed plan of care with patient and her mother, verbalized understanding and agreement. Denied further questions or needs. Assessment/Plan:    Sepsis  -Criteria met: Fever, leukocytosis, tachycardia, tachypnea  -Source proctitis, Bibasilar multifocal pneumonia  -Continue IV antibiotics  -Sepsis resolved  -Echo without vegetation  -Blood cultures no growth to date  -Sepsis did not respond to Flagyl and Cipro, but did respond to Carbapenem's. ID plans for empiric total 10-day course of Carbapenem's.     Proctitis  -CT abdomen pelvis concerning for proctitis  -Patient had recent hemorrhoid surgery and has struggled with fecal impaction since.   -Infectious disease on case  -Switch to ertapenem 1 g every 24 hour, anticipation of discharge home soon  -Lactobacillus twice daily while on meropenem  -Patient has a PICC line  -GI consulted  -Continue MiraLAX twice daily    UTI  -Culture negative thus far    Bilateral pleural effusion  -Respiratory viral panel ordered, negative  -Urine Legionella and strep pneumoniae negative  -ID checking procalcitonin and BNP  -Nasal MRSA probe pending  -Continue meropenem  -Pulmonology on board. Chest x-ray shows moderate size right pleural effusion, this will need to be monitored for resolution. Is been some concerns about aspiration due to persistent fever and bilateral infiltrates. -chest x-ray pending  For this am    Acute hypoxic respiratory failure  -Required supplemental O2, has been weaned to room air with sats 98%  - secondary to volume overload/pulmonary edema  -Treated respiratory status with diuresis, improved edema    Volume overload  -Continue Lasix 40 mg IV twice daily    Transaminitis  -Resolved    GERD  -Continue Protonix and Carafate    Anxiety  -Ativan IV as needed    Active Hospital Problems    Diagnosis     Receiving intravenous antibiotic treatment as outpatient [Z79.2]     Complex care coordination [Z71.89]     Weight loss counseling, encounter for [Z71.3]     Acute febrile illness [R50.9]     Septicemia ([facility] [address] [A41.9]     Abdominal pain [R10.9]     Proctitis [K62.89]     Acute cystitis without hematuria [N30.00]     Abdominal pain, right upper quadrant [R10.11]     Transaminitis [R74.01]     Overweight (BMI 25.0-29. 9) [E66.3]        Medications:  Reviewed    Infusion Medications    sodium chloride 25 mL (05/29/21 1113)     Scheduled Medications    ertapenem (INVanz) IVPB  1,000 mg Intravenous Q24H    guaiFENesin  600 mg Oral BID    furosemide  40 mg Intravenous BID    lactobacillus  1 capsule Oral BID WC    sucralfate  1 g Oral 4 times per day    lidocaine 1 % injection  5 mL Intradermal Once    sodium chloride flush  5-40 mL Intravenous 2 times per day    polyethylene glycol  17 g Oral BID    enoxaparin  40 mg Subcutaneous Daily    pantoprazole  40 mg Oral QAM AC    GI cocktail   Oral Once     PRN Meds: lip balm petroleum free, sodium chloride flush, sodium chloride, LORazepam, ondansetron, promethazine **OR** ondansetron, polyethylene glycol, acetaminophen **OR** acetaminophen, perflutren lipid microspheres, promethazine      Intake/Output Summary (Last 24 hours) at 5/29/2021 1140  Last data filed at 5/29/2021 0806  Gross per 24 hour   Intake 360 ml   Output --   Net 360 ml       Physical Exam Performed:    /83   Pulse 81   Temp 98.2 °F (36.8 °C) (Oral)   Resp 18   Ht 5' 5\" (1.651 m)   Wt 184 lb 9.6 oz (83.7 kg)   SpO2 97%   BMI 30.72 kg/m²     General appearance: No apparent distress, appears stated age and cooperative. HEENT: Pupils equal, round, and reactive to light. Conjunctivae/corneas clear. Neck: Supple, with full range of motion. No jugular venous distention. Trachea midline. Respiratory: diminshed bilaterally   cardiovascular: Regular rate and rhythm with normal S1/S2 without murmurs, rubs or gallops. Abdomen: Soft, non-tender, non-distended with normal bowel sounds. Musculoskeletal: No clubbing, cyanosis or edema bilaterally. Full range of motion without deformity. Skin: Skin color, texture, turgor normal.  No rashes or lesions. Neurologic:  Neurovascularly intact without any focal sensory/motor deficits. Cranial nerves: II-XII intact, grossly non-focal.  Psychiatric: Alert and oriented, thought content appropriate, normal insight  Capillary Refill: Brisk,< 3 seconds   Peripheral Pulses: +2 palpable, equal bilaterally       Labs:   Recent Labs     05/27/21 0637 05/28/21 0615 05/29/21  0512   WBC 7.3 6.4 7.1   HGB 9.6* 10.6* 10.8*   HCT 28.3* 31.3* 32.0*    199 265     Recent Labs     05/27/21  0637 05/28/21  0615 05/29/21  0512    139 139   K 3.0* 3.2* 3.5    102 101   CO2 24 27 28   BUN 15 17 14   CREATININE 0.7 0.7 0.6   CALCIUM 8.3 8.5 8.8   PHOS 1.3* 2.6 3.5     Recent Labs     05/27/21  0637 05/28/21  0615 05/29/21  0512   AST 23 29 39*   ALT 35 37 40   BILITOT 0.5 0.4 0.4   ALKPHOS 82 92 88     No results for input(s): INR in the last 72 hours.   No results for input(s): [place] in the last 72 hours. Urinalysis:      Lab Results   Component Value Date    NITRU Negative 05/23/2021    WBCUA 11 05/23/2021    BACTERIA 2+ 05/23/2021    RBCUA 0-2 05/23/2021    BLOODU Negative 05/23/2021    SPECGRAV 1.016 05/23/2021    GLUCOSEU Negative 05/23/2021       Radiology:  XR CHEST PORTABLE   Final Result   Marked worsening in the appearance of the chest with development of a large   amount of bibasilar opacity thought to be due to bibasilar pneumonias and   small to moderate pleural effusions. No abnormal vascular congestion. XR ABDOMEN (KUB) (SINGLE AP VIEW)   Final Result      CT CHEST PULMONARY EMBOLISM W CONTRAST   Final Result   1. No acute pulmonary embolism to the segmental arteries. 2. Multifocal airspace disease. Correlate with presentation for pneumonia. Follow-up to resolution recommended. 3. Other findings as described. US GALLBLADDER RUQ   Final Result   Status post cholecystectomy. No significant biliary ductal dilatation      Heterogeneous echotexture throughout the liver, either technical or due to   diffuse hepatocellular disease      Small right-sided pleural effusion         US NON OB TRANSVAGINAL   Final Result   Poor visualization of the ovaries. Nodular density in the right adnexa is   seen, difficult to determine as to whether not this represents right ovary or   large fibroid. Small amount of free fluid in pelvis. XR ABDOMEN (KUB) (SINGLE AP VIEW)   Final Result   Unremarkable abdomen. CT ABDOMEN PELVIS W IV CONTRAST Additional Contrast? None   Final Result   1. Wall thickening of the rectum with surrounding stranding. Infection and   inflammation are in the differential.   2. Lobulated enlarged uterus may be due to fibroids. Ovaries are not well   visualized. Ultrasound or MRI would better evaluate. 3. Other findings as described.          XR CHEST PORTABLE   Final Result   Negative portable chest.

## 2021-05-29 NOTE — PROGRESS NOTES
Patient discharged with PICC in place in Left upper arm. Patient will receive IV antibiotics per home care. Patient discharge instructions given to patient. Patient verbalizes understanding of discharge instructions and will follow up as directed. No additional needs voiced.

## 2021-05-30 LAB — BLOOD CULTURE, ROUTINE: NORMAL

## 2021-05-31 LAB
ALBUMIN SERPL-MCNC: 3.7 G/DL
ALP BLD-CCNC: 88 U/L
ALT SERPL-CCNC: 59 U/L
ANION GAP SERPL CALCULATED.3IONS-SCNC: NORMAL MMOL/L
AST SERPL-CCNC: 56 U/L
BASOPHILS ABSOLUTE: 0.1 /ΜL
BASOPHILS RELATIVE PERCENT: 1 %
BILIRUB SERPL-MCNC: 0.3 MG/DL (ref 0.1–1.4)
BUN BLDV-MCNC: 9 MG/DL
CALCIUM SERPL-MCNC: 8.8 MG/DL
CHLORIDE BLD-SCNC: 102 MMOL/L
CO2: 28 MMOL/L
CREAT SERPL-MCNC: 0.6 MG/DL
EOSINOPHILS ABSOLUTE: 0.1 /ΜL
EOSINOPHILS RELATIVE PERCENT: 1 %
GFR CALCULATED: NORMAL
GLUCOSE BLD-MCNC: 82 MG/DL
HCT VFR BLD CALC: 35.1 % (ref 36–46)
HEMOGLOBIN: 11.6 G/DL (ref 12–16)
LYMPHOCYTES ABSOLUTE: 1.4 /ΜL
LYMPHOCYTES RELATIVE PERCENT: 19 %
MCH RBC QN AUTO: 28.6 PG
MCHC RBC AUTO-ENTMCNC: 33.2 G/DL
MCV RBC AUTO: 86.1 FL
MONOCYTES ABSOLUTE: 0.5 /ΜL
MONOCYTES RELATIVE PERCENT: 7 %
NEUTROPHILS ABSOLUTE: 5.5 /ΜL
NEUTROPHILS RELATIVE PERCENT: ABNORMAL
PDW BLD-RTO: 14.1 %
PLATELET # BLD: 470 K/ΜL
PMV BLD AUTO: 8.3 FL
POTASSIUM SERPL-SCNC: 4.3 MMOL/L
RBC # BLD: 4.08 10^6/ΜL
SODIUM BLD-SCNC: 140 MMOL/L
TOTAL PROTEIN: 6.6
WBC # BLD: 7.6 10^3/ML

## 2021-06-02 ENCOUNTER — TELEPHONE (OUTPATIENT)
Dept: PULMONOLOGY | Age: 46
End: 2021-06-02

## 2021-06-02 ENCOUNTER — TELEPHONE (OUTPATIENT)
Dept: INFECTIOUS DISEASES | Age: 46
End: 2021-06-02

## 2021-06-02 NOTE — TELEPHONE ENCOUNTER
Patient called to request her antibiotic be extended, as she is concerned sepsis will return. Please advise.

## 2021-06-02 NOTE — TELEPHONE ENCOUNTER
Patient was seen at [facility]. States she was supposed to be given a script for yeast infections since she is on IV antibiotics. Can you send to her pharmacy please      Pharmacy updated in chart:   [address], [address]  Carin Sanches [phone] - F [phone]

## 2021-06-03 ENCOUNTER — TELEPHONE (OUTPATIENT)
Dept: INFECTIOUS DISEASES | Age: 46
End: 2021-06-03

## 2021-06-03 NOTE — TELEPHONE ENCOUNTER
If her symptoms are resolved and she is staying afebrile, I do not see a reason to empirically extend her ertapenem. If she is doing okay otherwise, my suggestion will be to stop ertapenem as planned and see how she does without antibiotics. She can do a video visit with me next week, if she prefers.

## 2021-06-03 NOTE — TELEPHONE ENCOUNTER
Patient advised, she will go to see her doctor today and ask if she can be cleared to go back to work by her PCP.   VV with Dr. Brien Mckeon is next Thursday at 11:20

## 2021-06-03 NOTE — TELEPHONE ENCOUNTER
If he remains afebrile till Monday after stopping antibiotics, no objections from infection standpoint to return to work on Monday. She may still want to check with his PCP for full clearance from strength and physical activity standpoint. Solis Smith

## 2021-06-03 NOTE — TELEPHONE ENCOUNTER
Noted.  Is it ok for her to go back to work on Monday? Any PO abx you want her to start taking after d/c the IV?   Thank you

## 2021-06-03 NOTE — TELEPHONE ENCOUNTER
Is it OK for Dr. Quentin Mujica to end the IV abx therapy for this patient? The term date is 6/4/21    This nurse called patient to ask how she is feeling. Kayla Amaya states that she is feeling much better, the IV abx is helping. Getting stronger every day, getting around easy, even able to drive, denies fever, O2Sat 97% or 98%, water retention is better too, her ankles are not as swollen as before. Patient wants to go back to work on Monday, asking if that would be ok with you. Please see attached lab results that were just sent by [facility]     This nurse could not get a hold of Jessie Herrera, the nurse to ask about patient's condition on last visit.     Please advise

## 2021-06-10 ENCOUNTER — VIRTUAL VISIT (OUTPATIENT)
Dept: INFECTIOUS DISEASES | Age: 46
End: 2021-06-10
Payer: COMMERCIAL

## 2021-06-10 DIAGNOSIS — Z09 HOSPITAL DISCHARGE FOLLOW-UP: Primary | ICD-10-CM

## 2021-06-10 DIAGNOSIS — K62.89 PROCTITIS: ICD-10-CM

## 2021-06-10 DIAGNOSIS — Z87.01 HISTORY OF PNEUMONIA: ICD-10-CM

## 2021-06-10 DIAGNOSIS — Z71.3 WEIGHT LOSS COUNSELING, ENCOUNTER FOR: ICD-10-CM

## 2021-06-10 DIAGNOSIS — E66.3 OVERWEIGHT (BMI 25.0-29.9): ICD-10-CM

## 2021-06-10 PROCEDURE — 99213 OFFICE O/P EST LOW 20 MIN: CPT | Performed by: INTERNAL MEDICINE

## 2021-06-10 ASSESSMENT — ENCOUNTER SYMPTOMS
APNEA: 0
FACIAL SWELLING: 0
DIARRHEA: 0
CHEST TIGHTNESS: 0
SHORTNESS OF BREATH: 0
ABDOMINAL PAIN: 0
STRIDOR: 0
BLOOD IN STOOL: 0
COLOR CHANGE: 0
RHINORRHEA: 0
TROUBLE SWALLOWING: 0
EYE DISCHARGE: 0
PHOTOPHOBIA: 0
NAUSEA: 0
EYE REDNESS: 0
CHOKING: 0
COUGH: 1

## 2021-06-10 NOTE — PROGRESS NOTES
Infectious Diseases Oupatient Follow-up Note        Luigi Del Real is a 54 y.o. female being evaluated by a Virtual Visit encounter to address concerns as mentioned above. A caregiver was present when appropriate. Due to this being a TeleHealth encounter (During NLLEB-73 public health emergency), evaluation of the following organ systems was limited: Vitals/Constitutional/EENT/Resp/CV/GI//MS/Neuro/Skin/Heme-Lymph-Imm. Pursuant to the emergency declaration under the [address] [place] [address], [address] [place] [address] and the [employer] Act, this Virtual Visit was conducted with patient's (and/or legal guardian's) consent, to reduce the patient's risk of exposure to COVID-19 and provide necessary medical care. The patient (and/or legal guardian) has also been advised to contact this office for worsening conditions or problems, and seek emergency medical treatment and/or call 911 if deemed necessary. Services were provided through an audio and/or video synchronous discussion virtually to substitute for in-person clinic visit. Patient and provider were located at their individual homes. --Trino Barba MD on 6/10/2021 at 11:48 AM    An electronic signature was used to authenticate this note. Reason for Visit:               Follow-up for proctitis  Primary Care Physician:  Zachary Swann DO  History Obtained From:   Patient , Medical Records     CHIEF COMPLAINT:    Chief Complaint   Patient presents with    Follow-up     PICC has been removed. overall feeling better, she is back at work. gaining more strenght every day. has a slight cough but has F/U with pulm tomorrow. INTERVAL HISTORY: Luigi Del Real is a 54 y.o. pleasant female patient, who had a virtual visit with me today for follow-up. History was obtained from chart review and the patient.  The patient had a virtual visit with me today for above mentioned complaints. The patient was hospitalized couple weeks ago for sepsis and had a CT scan of abdomen and pelvis done which was concerning for proctitis. He was having high fever and leukocytosis and tachycardia and responded to meropenem. He was discharged on IV ertapenem 1 g every 24 hours and antibiotic was stopped as planned on 6/4/2021. Past Medical History:   Past medical and surgical history was reviewed by me during this visit in detail. No past medical history on file. Past Surgical History:    Past Surgical History:   Procedure Laterality Date    SIGMOIDOSCOPY N/A 5/25/2021    SIGMOIDOSCOPY DIAGNOSTIC FLEXIBLE performed by Karan Orta MD at [address] [facility] ENDOSCOPY       Current Medications: All medications were reviewed by me during this visit  Current Outpatient Medications   Medication Sig Dispense Refill    polyethylene glycol (GLYCOLAX) 17 g packet Take 17 g by mouth 2 times daily 527 g 0    pantoprazole (PROTONIX) 40 MG tablet Take 1 tablet by mouth every morning (before breakfast) 30 tablet 0    sucralfate (CARAFATE) 1 GM tablet Take 1 tablet by mouth 4 times daily 120 tablet 0    clonazePAM (KLONOPIN) 0.125 MG disintegrating tablet Take 0.125 mg by mouth nightly as needed. No current facility-administered medications for this visit. Family history: All family history was reviewed by me today    Family History   Family history unknown: Yes       No family history of immunocompromising or autoimmune conditions. No h/o TB in in family or contacts    REVIEW OF SYSTEMS:      Review of Systems   Constitutional: Negative for chills, diaphoresis and unexpected weight change. HENT: Negative for congestion, ear discharge, ear pain, facial swelling, hearing loss, rhinorrhea and trouble swallowing. Eyes: Negative for photophobia, discharge, redness and visual disturbance. Respiratory: Positive for cough.  Negative for apnea, choking, chest tightness, shortness of breath and stridor. Occasional dry cough   Cardiovascular: Negative for chest pain and palpitations. Gastrointestinal: Negative for abdominal pain, blood in stool, diarrhea and nausea. Endocrine: Negative for polydipsia, polyphagia and polyuria. Genitourinary: Negative for difficulty urinating, dysuria, frequency, hematuria, menstrual problem and vaginal discharge. Musculoskeletal: Negative for arthralgias, joint swelling, myalgias and neck stiffness. Skin: Negative for color change and rash. Allergic/Immunologic: Negative for immunocompromised state. Neurological: Negative for dizziness, seizures, speech difficulty, light-headedness and headaches. Hematological: Negative for adenopathy. Psychiatric/Behavioral: Negative for agitation, hallucinations and suicidal ideas. PHYSICAL EXAM:      There were no vitals filed for this visit. Physical Exam  Constitutional:       General: She is not in acute distress. Appearance: She is well-developed. She is not diaphoretic. Comments: Limited exam could be conducted due to inherent limitations of the video visit   HENT:      Head: Normocephalic. Nose: Nose normal.   Eyes:      General: No scleral icterus. Right eye: No discharge. Left eye: No discharge. Conjunctiva/sclera: Conjunctivae normal.   Pulmonary:      Effort: Pulmonary effort is normal.   Musculoskeletal:         General: No deformity. Cervical back: Normal range of motion. Skin:     Coloration: Skin is not jaundiced. Findings: No erythema or rash. Neurological:      Mental Status: She is alert and oriented to person, place, and time. Mental status is at baseline.    Psychiatric:         Mood and Affect: Mood normal.         Judgment: Judgment normal.              DATA:  All available lab data wasreviewed by me during this visit    CBC:  Lab Results   Component Value Date    WBC 7.1 05/29/2021    HGB 10.8 (L) 05/29/2021    HCT 32.0 (L) 05/29/2021    MCV 84.6 05/29/2021     05/29/2021    LYMPHOPCT 16.6 05/29/2021    RBC 3.78 (L) 05/29/2021    MCH 28.6 05/29/2021    MCHC 33.8 05/29/2021    RDW 14.4 05/29/2021       Last BMP:  Lab Results   Component Value Date     05/29/2021    K 3.5 05/29/2021     05/29/2021    CO2 28 05/29/2021    BUN 14 05/29/2021    CREATININE 0.6 05/29/2021    GLUCOSE 105 05/29/2021    CALCIUM 8.8 05/29/2021        Hepatic Function Panel:  Lab Results   Component Value Date    ALKPHOS 88 05/29/2021    ALT 40 05/29/2021    AST 39 05/29/2021    PROT 6.1 05/29/2021    BILITOT 0.4 05/29/2021    LABALBU 3.3 05/29/2021       Last CK: No results found for: CKTOTAL    Last ESR:  No results found for: SEDRATE    Last CRP:  No results found for: CRP      Imaging: All pertinent images and reports for the current visit were reviewed by me during this visit. Outside records:    Labs, Microbiology,Radiology and pertinent results from Care everywhere, if available, were reviewed as a part of the consultation. Problem list:       Patient Active Problem List   Diagnosis Code    Acute febrile illness R50.9    Septicemia ([facility] [address] [address] A41.9    Abdominal pain R10.9    Proctitis K62.89    Acute cystitis without hematuria N30.00    Abdominal pain, right upper quadrant R10.11    Transaminitis R74.01    Overweight (BMI 25.0-29. 9) E66.3    Receiving intravenous antibiotic treatment as outpatient Z79.2    Complex care coordination Z71.89    Weight loss counseling, encounter for Z71.3       Microbiology:       All available micro data was reviewed by me during this visit      Allergies and immunizations:       Known drug Allergies: All allergies data was reviewed by me during this visit  Codeine    Immunizations: All immunizations were reviewed byme in detail. There is no immunization history on file for this patient.     SocialHistory:  All social and epidemiologic history was reviewed and updated be me in detail today. Social History     Socioeconomic History    Marital status: Single     Spouse name: Not on file    Number of children: Not on file    Years of education: Not on file    Highest education level: Not on file   Occupational History    Not on file   Tobacco Use    Smoking status: Never Smoker    Smokeless tobacco: Never Used   Substance and Sexual Activity    Alcohol use: Yes    Drug use: Never    Sexual activity: Not on file   Other Topics Concern    Not on file   Social History Narrative    Not on file     Social Determinants of Health     Financial Resource Strain:     Difficulty of Paying Living Expenses:    Food Insecurity:     Worried About Running Out of Food in the Last Year:     [address] in the Last Year:    Transportation Needs:     Lack of Transportation (Medical):  Lack of Transportation (Non-Medical):    Physical Activity:     Days of Exercise per Week:     Minutes of Exercise per Session:    Stress:     Feeling of Stress :    Social Connections:     Frequency of Communication with Friends and Family:     Frequency of Social Gatherings with Friends and Family:     Attends Congregational Services:     Active Member of Clubs or Organizations:     Attends Club or Organization Meetings:     Marital Status:    Intimate Partner Violence:     Fear of Current or Ex-Partner:     Emotionally Abused:     Physically Abused:     Sexually Abused:        COVID VACCINATION AND LAB RESULT RECORDS:     Internal Administration   First Dose      Second Dose           Last COVID Lab SARS-CoV-2 (no units)   Date Value   05/24/2021 Not Detected            IMPRESSION:    The patient is a 54 y.o. old female who  has no past medical history on file. was seen today for following problems:    1. Hospital discharge follow-up    2. Overweight (BMI 25.0-29.9)    3. Weight loss counseling, encounter for    4. History of pneumonia    5.  Proctitis        Discussion:      Patient's last set

## 2021-06-11 ENCOUNTER — OFFICE VISIT (OUTPATIENT)
Dept: PULMONOLOGY | Age: 46
End: 2021-06-11
Payer: COMMERCIAL

## 2021-06-11 VITALS
BODY MASS INDEX: 27.66 KG/M2 | SYSTOLIC BLOOD PRESSURE: 118 MMHG | WEIGHT: 166 LBS | DIASTOLIC BLOOD PRESSURE: 80 MMHG | HEART RATE: 86 BPM | HEIGHT: 65 IN | OXYGEN SATURATION: 99 %

## 2021-06-11 DIAGNOSIS — R91.8 LUNG INFILTRATE ON CT: ICD-10-CM

## 2021-06-11 DIAGNOSIS — J90 PLEURAL EFFUSION, BILATERAL: Primary | ICD-10-CM

## 2021-06-11 PROCEDURE — 99214 OFFICE O/P EST MOD 30 MIN: CPT | Performed by: INTERNAL MEDICINE

## 2021-06-11 ASSESSMENT — ENCOUNTER SYMPTOMS
CHEST TIGHTNESS: 0
SORE THROAT: 0
ABDOMINAL DISTENTION: 0
WHEEZING: 0
CHOKING: 0
ABDOMINAL PAIN: 0
BLOOD IN STOOL: 0
CONSTIPATION: 0
DIARRHEA: 0
STRIDOR: 0
SHORTNESS OF BREATH: 0
ANAL BLEEDING: 0
VOICE CHANGE: 0
COUGH: 1
SINUS PRESSURE: 0
RHINORRHEA: 0
BACK PAIN: 0
APNEA: 0

## 2021-06-11 NOTE — PROGRESS NOTES
Thor Burleson    YOB: 1975     Date of Service:  6/11/2021     Chief Complaint   Patient presents with    Follow-Up from Hospital         HPI patient was recently hospitalized between 5/24 and 5/29 for sepsis of unclear etiology. She was noted to have fecal impaction following a recent hemorrhoidal banding and was further noted to have ? Proctitis/bilateral pulmonary infiltrates/pleural effusions. Was treated with a course of antibiotics-completed ertapenem approximately a week ago. She still has a slight cough, with small amounts of mucoid appearing phlegm. Denies any significant shortness of breath or wheezing. Has lost approximately 20 pounds in weight since hospitalization-was treated aggressively with IV Lasix for volume overload. Allergies   Allergen Reactions    Codeine Nausea Only     Outpatient Medications Marked as Taking for the 6/11/21 encounter (Office Visit) with Osvaldo Adler MD   Medication Sig Dispense Refill    polyethylene glycol (GLYCOLAX) 17 g packet Take 17 g by mouth 2 times daily 527 g 0    pantoprazole (PROTONIX) 40 MG tablet Take 1 tablet by mouth every morning (before breakfast) 30 tablet 0    sucralfate (CARAFATE) 1 GM tablet Take 1 tablet by mouth 4 times daily 120 tablet 0    clonazePAM (KLONOPIN) 0.125 MG disintegrating tablet Take 0.125 mg by mouth nightly as needed. Immunization History   Administered Date(s) Administered    Tdap (Boostrix, Adacel) 07/31/2013       History reviewed. No pertinent past medical history. Past Surgical History:   Procedure Laterality Date    SIGMOIDOSCOPY N/A 5/25/2021    SIGMOIDOSCOPY DIAGNOSTIC FLEXIBLE performed by Rakel Barger MD at [address] [place] [address] History   Family history unknown: Yes       Review of Systems:  Review of Systems   Constitutional: Negative for activity change, appetite change, fatigue and fever.    HENT: Negative for congestion, ear discharge, ear pain, postnasal drip, swelling, tenderness or deformity. Skin:     Coloration: Skin is not pale. Findings: No erythema or rash. Neurological:      Mental Status: She is alert and oriented to person, place, and time. Cranial Nerves: No cranial nerve deficit. Motor: No abnormal muscle tone. Coordination: Coordination normal.      Deep Tendon Reflexes: Reflexes normal.             Health Maintenance   Topic Date Due    Pneumococcal 0-64 years Vaccine (1 of 2 - PPSV23) Never done    COVID-19 Vaccine (1) Never done    Cervical cancer screen  Never done    Lipid screen  Never done    Flu vaccine (Season Ended) 09/01/2021    DTaP/Tdap/Td vaccine (2 - Td or Tdap) 07/31/2023    Hepatitis C screen  Completed    HIV screen  Completed    Hepatitis A vaccine  Aged Out    Hepatitis B vaccine  Aged Out    Hib vaccine  Aged Out    Meningococcal (ACWY) vaccine  Aged Out          Assessment/Plan:     Diagnosis Orders   1. Pleural effusion, bilateral  CT CHEST WO CONTRAST   2. Lung infiltrate on CT  CT CHEST WO CONTRAST      Recent hospitalization with fecal impaction/fevers and sepsis of unclear etiology. Noted to have significant bibasal infiltrates and effusions, most likely related to fluid overload from aggressive IV resuscitation. Persistent cough, though it has improved. Currently no shortness of breath. Clinical exam was benign. No fevers reported since completion of antibiotics. Will repeat CT imaging to assess for persistence of infiltrates/effusions. If there are still concerns for persistent infiltrates, then bronchoscopy would be indicated. I have discussed briefly with the patient about the plan. Return in about 1 month (around 7/11/2021).

## 2021-06-16 ENCOUNTER — HOSPITAL ENCOUNTER (OUTPATIENT)
Dept: CT IMAGING | Age: 46
Discharge: HOME | End: 2021-06-16
Payer: COMMERCIAL

## 2021-06-16 DIAGNOSIS — J90 PLEURAL EFFUSION, BILATERAL: ICD-10-CM

## 2021-06-16 DIAGNOSIS — R91.8 LUNG INFILTRATE ON CT: ICD-10-CM

## 2021-06-16 PROCEDURE — 71250 CT THORAX DX C-: CPT

## 2023-01-20 ENCOUNTER — APPOINTMENT (RX ONLY)
Dept: URBAN - METROPOLITAN AREA CLINIC 170 | Facility: CLINIC | Age: 48
Setting detail: DERMATOLOGY
End: 2023-01-20

## 2023-01-20 DIAGNOSIS — T78.40 ALLERGY, UNSPECIFIED: ICD-10-CM

## 2023-01-20 DIAGNOSIS — D22 MELANOCYTIC NEVI: ICD-10-CM

## 2023-01-20 DIAGNOSIS — L82.1 OTHER SEBORRHEIC KERATOSIS: ICD-10-CM

## 2023-01-20 DIAGNOSIS — Z85.828 PERSONAL HISTORY OF OTHER MALIGNANT NEOPLASM OF SKIN: ICD-10-CM

## 2023-01-20 DIAGNOSIS — D18.0 HEMANGIOMA: ICD-10-CM

## 2023-01-20 DIAGNOSIS — L70.0 ACNE VULGARIS: ICD-10-CM | Status: STABLE

## 2023-01-20 DIAGNOSIS — L81.4 OTHER MELANIN HYPERPIGMENTATION: ICD-10-CM

## 2023-01-20 PROBLEM — T78.40XA ALLERGY, UNSPECIFIED, INITIAL ENCOUNTER: Status: ACTIVE | Noted: 2023-01-20

## 2023-01-20 PROBLEM — D22.5 MELANOCYTIC NEVI OF TRUNK: Status: ACTIVE | Noted: 2023-01-20

## 2023-01-20 PROBLEM — D18.01 HEMANGIOMA OF SKIN AND SUBCUTANEOUS TISSUE: Status: ACTIVE | Noted: 2023-01-20

## 2023-01-20 PROCEDURE — ? ADDITIONAL NOTES

## 2023-01-20 PROCEDURE — ? COUNSELING

## 2023-01-20 PROCEDURE — ? PRESCRIPTION

## 2023-01-20 PROCEDURE — ? FULL BODY SKIN EXAM

## 2023-01-20 PROCEDURE — ? PRESCRIPTION MEDICATION MANAGEMENT

## 2023-01-20 PROCEDURE — 99213 OFFICE O/P EST LOW 20 MIN: CPT

## 2023-01-20 PROCEDURE — ? TREATMENT REGIMEN

## 2023-01-20 RX ORDER — MUPIROCIN 20 MG/G
OINTMENT TOPICAL
Qty: 22 | Refills: 2 | Status: ERX | COMMUNITY
Start: 2023-01-20

## 2023-01-20 RX ADMIN — MUPIROCIN: 20 OINTMENT TOPICAL at 00:00

## 2023-01-20 ASSESSMENT — LOCATION DETAILED DESCRIPTION DERM
LOCATION DETAILED: EPIGASTRIC SKIN
LOCATION DETAILED: RIGHT LATERAL ABDOMEN
LOCATION DETAILED: LEFT INFERIOR CENTRAL MALAR CHEEK
LOCATION DETAILED: INFERIOR THORACIC SPINE
LOCATION DETAILED: RIGHT MID-UPPER BACK

## 2023-01-20 ASSESSMENT — LOCATION SIMPLE DESCRIPTION DERM
LOCATION SIMPLE: ABDOMEN
LOCATION SIMPLE: LEFT CHEEK
LOCATION SIMPLE: RIGHT UPPER BACK
LOCATION SIMPLE: UPPER BACK

## 2023-01-20 ASSESSMENT — LOCATION ZONE DERM
LOCATION ZONE: FACE
LOCATION ZONE: TRUNK

## 2023-01-20 NOTE — HPI: EVALUATION OF SKIN LESION(S)
What Type Of Note Output Would You Prefer (Optional)?: Bullet Format
Hpi Title: Evaluation of Skin Lesions
Have Your Spot(S) Been Treated In The Past?: has been treated
Family Member: Dad

## 2023-01-20 NOTE — PROCEDURE: COUNSELING
Detail Level: Detailed
Detail Level: Generalized
Topical Retinoid Pregnancy And Lactation Text: This medication is Pregnancy Category C. It is unknown if this medication is excreted in breast milk.
Winlevi Counseling:  I discussed with the patient the risks of topical clascoterone including but not limited to erythema, scaling, itching, and stinging. Patient voiced their understanding.
Tetracycline Counseling: Patient counseled regarding possible photosensitivity and increased risk for sunburn.  Patient instructed to avoid sunlight, if possible.  When exposed to sunlight, patients should wear protective clothing, sunglasses, and sunscreen.  The patient was instructed to call the office immediately if the following severe adverse effects occur:  hearing changes, easy bruising/bleeding, severe headache, or vision changes.  The patient verbalized understanding of the proper use and possible adverse effects of tetracycline.  All of the patient's questions and concerns were addressed. Patient understands to avoid pregnancy while on therapy due to potential birth defects.
High Dose Vitamin A Pregnancy And Lactation Text: High dose vitamin A therapy is contraindicated during pregnancy and breast feeding.
Isotretinoin Pregnancy And Lactation Text: This medication is Pregnancy Category X and is considered extremely dangerous during pregnancy. It is unknown if it is excreted in breast milk.
Dapsone Counseling: I discussed with the patient the risks of dapsone including but not limited to hemolytic anemia, agranulocytosis, rashes, methemoglobinemia, kidney failure, peripheral neuropathy, headaches, GI upset, and liver toxicity.  Patients who start dapsone require monitoring including baseline LFTs and weekly CBCs for the first month, then every month thereafter.  The patient verbalized understanding of the proper use and possible adverse effects of dapsone.  All of the patient's questions and concerns were addressed.
Tazorac Pregnancy And Lactation Text: This medication is not safe during pregnancy. It is unknown if this medication is excreted in breast milk.
Azithromycin Pregnancy And Lactation Text: This medication is considered safe during pregnancy and is also secreted in breast milk.
Aklief counseling:  Patient advised to apply a pea-sized amount only at bedtime and wait 30 minutes after washing their face before applying.  If too drying, patient may add a non-comedogenic moisturizer.  The most commonly reported side effects including irritation, redness, scaling, dryness, stinging, burning, itching, and increased risk of sunburn.  The patient verbalized understanding of the proper use and possible adverse effects of retinoids.  All of the patient's questions and concerns were addressed.
Doxycycline Counseling:  Patient counseled regarding possible photosensitivity and increased risk for sunburn.  Patient instructed to avoid sunlight, if possible.  When exposed to sunlight, patients should wear protective clothing, sunglasses, and sunscreen.  The patient was instructed to call the office immediately if the following severe adverse effects occur:  hearing changes, easy bruising/bleeding, severe headache, or vision changes.  The patient verbalized understanding of the proper use and possible adverse effects of doxycycline.  All of the patient's questions and concerns were addressed.
Azelaic Acid Pregnancy And Lactation Text: This medication is considered safe during pregnancy and breast feeding.
Sarecycline Counseling: Patient advised regarding possible photosensitivity and discoloration of the teeth, skin, lips, tongue and gums.  Patient instructed to avoid sunlight, if possible.  When exposed to sunlight, patients should wear protective clothing, sunglasses, and sunscreen.  The patient was instructed to call the office immediately if the following severe adverse effects occur:  hearing changes, easy bruising/bleeding, severe headache, or vision changes.  The patient verbalized understanding of the proper use and possible adverse effects of sarecycline.  All of the patient's questions and concerns were addressed.
Use Enhanced Medication Counseling?: No
Doxycycline Pregnancy And Lactation Text: This medication is Pregnancy Category D and not consider safe during pregnancy. It is also excreted in breast milk but is considered safe for shorter treatment courses.
Bactrim Counseling:  I discussed with the patient the risks of sulfa antibiotics including but not limited to GI upset, allergic reaction, drug rash, diarrhea, dizziness, photosensitivity, and yeast infections.  Rarely, more serious reactions can occur including but not limited to aplastic anemia, agranulocytosis, methemoglobinemia, blood dyscrasias, liver or kidney failure, lung infiltrates or desquamative/blistering drug rashes.
Benzoyl Peroxide Pregnancy And Lactation Text: This medication is Pregnancy Category C. It is unknown if benzoyl peroxide is excreted in breast milk.
Spironolactone Counseling: Patient advised regarding risks of diarrhea, abdominal pain, hyperkalemia, birth defects (for female patients), liver toxicity and renal toxicity. The patient may need blood work to monitor liver and kidney function and potassium levels while on therapy. The patient verbalized understanding of the proper use and possible adverse effects of spironolactone.  All of the patient's questions and concerns were addressed.
Birth Control Pills Counseling: Birth Control Pill Counseling: I discussed with the patient the potential side effects of OCPs including but not limited to increased risk of stroke, heart attack, thrombophlebitis, deep venous thrombosis, hepatic adenomas, breast changes, GI upset, headaches, and depression.  The patient verbalized understanding of the proper use and possible adverse effects of OCPs. All of the patient's questions and concerns were addressed.
Erythromycin Pregnancy And Lactation Text: This medication is Pregnancy Category B and is considered safe during pregnancy. It is also excreted in breast milk.
Detail Level: Zone
Topical Sulfur Applications Counseling: Topical Sulfur Counseling: Patient counseled that this medication may cause skin irritation or allergic reactions.  In the event of skin irritation, the patient was advised to reduce the amount of the drug applied or use it less frequently.   The patient verbalized understanding of the proper use and possible adverse effects of topical sulfur application.  All of the patient's questions and concerns were addressed.
High Dose Vitamin A Counseling: Side effects reviewed, pt to contact office should one occur.
Tetracycline Pregnancy And Lactation Text: This medication is Pregnancy Category D and not consider safe during pregnancy. It is also excreted in breast milk.
Dapsone Pregnancy And Lactation Text: This medication is Pregnancy Category C and is not considered safe during pregnancy or breast feeding.
Topical Retinoid counseling:  Patient advised to apply a pea-sized amount only at bedtime and wait 30 minutes after washing their face before applying.  If too drying, patient may add a non-comedogenic moisturizer. The patient verbalized understanding of the proper use and possible adverse effects of retinoids.  All of the patient's questions and concerns were addressed.
Winlevi Pregnancy And Lactation Text: This medication is considered safe during pregnancy and breastfeeding.
Spironolactone Pregnancy And Lactation Text: This medication can cause feminization of the male fetus and should be avoided during pregnancy. The active metabolite is also found in breast milk.
Birth Control Pills Pregnancy And Lactation Text: This medication should be avoided if pregnant and for the first 30 days post-partum.
Topical Clindamycin Counseling: Patient counseled that this medication may cause skin irritation or allergic reactions.  In the event of skin irritation, the patient was advised to reduce the amount of the drug applied or use it less frequently.   The patient verbalized understanding of the proper use and possible adverse effects of clindamycin.  All of the patient's questions and concerns were addressed.
Aklief Pregnancy And Lactation Text: It is unknown if this medication is safe to use during pregnancy.  It is unknown if this medication is excreted in breast milk.  Breastfeeding women should use the topical cream on the smallest area of the skin for the shortest time needed while breastfeeding.  Do not apply to nipple and areola.
Minocycline Counseling: Patient advised regarding possible photosensitivity and discoloration of the teeth, skin, lips, tongue and gums.  Patient instructed to avoid sunlight, if possible.  When exposed to sunlight, patients should wear protective clothing, sunglasses, and sunscreen.  The patient was instructed to call the office immediately if the following severe adverse effects occur:  hearing changes, easy bruising/bleeding, severe headache, or vision changes.  The patient verbalized understanding of the proper use and possible adverse effects of minocycline.  All of the patient's questions and concerns were addressed.
Azithromycin Counseling:  I discussed with the patient the risks of azithromycin including but not limited to GI upset, allergic reaction, drug rash, diarrhea, and yeast infections.
Tazorac Counseling:  Patient advised that medication is irritating and drying.  Patient may need to apply sparingly and wash off after an hour before eventually leaving it on overnight.  The patient verbalized understanding of the proper use and possible adverse effects of tazorac.  All of the patient's questions and concerns were addressed.
Erythromycin Counseling:  I discussed with the patient the risks of erythromycin including but not limited to GI upset, allergic reaction, drug rash, diarrhea, increase in liver enzymes, and yeast infections.
Bactrim Pregnancy And Lactation Text: This medication is Pregnancy Category D and is known to cause fetal risk.  It is also excreted in breast milk.
Topical Clindamycin Pregnancy And Lactation Text: This medication is Pregnancy Category B and is considered safe during pregnancy. It is unknown if it is excreted in breast milk.
Azelaic Acid Counseling: Patient counseled that medicine may cause skin irritation and to avoid applying near the eyes.  In the event of skin irritation, the patient was advised to reduce the amount of the drug applied or use it less frequently.   The patient verbalized understanding of the proper use and possible adverse effects of azelaic acid.  All of the patient's questions and concerns were addressed.
Topical Sulfur Applications Pregnancy And Lactation Text: This medication is Pregnancy Category C and has an unknown safety profile during pregnancy. It is unknown if this topical medication is excreted in breast milk.
Benzoyl Peroxide Counseling: Patient counseled that medicine may cause skin irritation and bleach clothing.  In the event of skin irritation, the patient was advised to reduce the amount of the drug applied or use it less frequently.   The patient verbalized understanding of the proper use and possible adverse effects of benzoyl peroxide.  All of the patient's questions and concerns were addressed.
Isotretinoin Counseling: Patient should get monthly blood tests, not donate blood, not drive at night if vision affected, not share medication, and not undergo elective surgery for 6 months after tx completed. Side effects reviewed, pt to contact office should one occur.

## (undated) DEVICE — PROCEDURE KIT ENDOSCP CUST

## (undated) DEVICE — SOLUTION IV IRRIG WATER 500ML POUR BRL ST 2F7113

## (undated) DEVICE — BW-412T DISP COMBO CLEANING BRUSH: Brand: SINGLE USE COMBINATION CLEANING BRUSH

## (undated) DEVICE — SET VLV 3 PC AWS DISPOSABLE GRDIAN SCOPEVALET

## (undated) DEVICE — GOWN AURORA NONREINF LG: Brand: MEDLINE INDUSTRIES, INC.